# Patient Record
Sex: FEMALE | Race: WHITE | NOT HISPANIC OR LATINO | Employment: FULL TIME | URBAN - METROPOLITAN AREA
[De-identification: names, ages, dates, MRNs, and addresses within clinical notes are randomized per-mention and may not be internally consistent; named-entity substitution may affect disease eponyms.]

---

## 2017-01-05 ENCOUNTER — OFFICE VISIT (OUTPATIENT)
Dept: SURGERY | Facility: CLINIC | Age: 67
End: 2017-01-05
Payer: MEDICARE

## 2017-01-05 VITALS
BODY MASS INDEX: 24.42 KG/M2 | HEART RATE: 60 BPM | DIASTOLIC BLOOD PRESSURE: 75 MMHG | HEIGHT: 67 IN | WEIGHT: 155.56 LBS | TEMPERATURE: 98 F | SYSTOLIC BLOOD PRESSURE: 130 MMHG

## 2017-01-05 DIAGNOSIS — Z85.3 PERSONAL HISTORY OF BREAST CANCER: Primary | ICD-10-CM

## 2017-01-05 DIAGNOSIS — Z80.3 FAMILY HISTORY OF BREAST CANCER: ICD-10-CM

## 2017-01-05 PROCEDURE — 1157F ADVNC CARE PLAN IN RCRD: CPT | Mod: S$GLB,,, | Performed by: NURSE PRACTITIONER

## 2017-01-05 PROCEDURE — 99214 OFFICE O/P EST MOD 30 MIN: CPT | Mod: S$GLB,,, | Performed by: NURSE PRACTITIONER

## 2017-01-05 PROCEDURE — 1160F RVW MEDS BY RX/DR IN RCRD: CPT | Mod: S$GLB,,, | Performed by: NURSE PRACTITIONER

## 2017-01-05 PROCEDURE — 1126F AMNT PAIN NOTED NONE PRSNT: CPT | Mod: S$GLB,,, | Performed by: NURSE PRACTITIONER

## 2017-01-05 PROCEDURE — 99999 PR PBB SHADOW E&M-EST. PATIENT-LVL III: CPT | Mod: PBBFAC,,, | Performed by: NURSE PRACTITIONER

## 2017-01-05 PROCEDURE — 1159F MED LIST DOCD IN RCRD: CPT | Mod: S$GLB,,, | Performed by: NURSE PRACTITIONER

## 2017-01-05 RX ORDER — TRAMADOL HYDROCHLORIDE 50 MG/1
TABLET ORAL
Refills: 0 | COMMUNITY
Start: 2016-12-29 | End: 2017-03-23

## 2017-01-05 NOTE — PROGRESS NOTES
Subjective:      Patient ID: Tamra Benjamin is a 66 y.o. female.    Chief Complaint: Breast Cancer Screening (CBE/Hx of Left Breast Cancer (Survivorship))      HPI: (PF, EPF - 1-3) (Detailed, Comp, - 4) patient presents today for survivorship visit, previous surgeon Dr Robins, Rad Onc Dr Foster. Currently denies breast mass, pain, nipple discharge, skin changes, unexplained weight loss, new onset bone pain. She reports sensation of heaviness left breast, previous pain left medial chest has resolved. She request all breast imaging to be done on the [facility] with a particular mammo tech    Diagnostic mammogram and left US 7-7-2016 with focal density left breast and change in echogenicity  Core biopsy left breast 7- with DCIS, ER/[place] negative  Left lumpectomy 7- with residual 1.2cm area of DCIS, 1mm posterior margin, adjuvant XRT    Review of Systems   Constitutional: Negative for appetite change and fatigue.   Respiratory: Negative for cough and shortness of breath.    Cardiovascular: Negative for chest pain.   Musculoskeletal: Negative for back pain.     Objective:   Physical Exam   Pulmonary/Chest: She exhibits no mass, no tenderness, no edema, no deformity, no swelling and no retraction. Right breast exhibits no inverted nipple, no mass, no nipple discharge, no skin change and no tenderness. Left breast exhibits no inverted nipple, no mass, no nipple discharge, no skin change and no tenderness. Breasts are symmetrical. There is no breast swelling.   Lumpectomy scar left medial breast at 6-7 o'clock with no mass, mild skin thickening from recent XRT. No upper extremity swelling, no previous axillary lymph node biopsy. Breathing non-labored    Lymphadenopathy:     She has no cervical adenopathy.     She has no axillary adenopathy.        Right: No supraclavicular adenopathy present.        Left: No supraclavicular adenopathy present.     Assessment:       1. Personal history of breast cancer     2. 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   Time in counseling discussing previous pathology, DCIS, treatment side effects, family history of breast cancer, sporadic verses family clustering verses hereditary breast cancer, and surveillance plan of care 30 min, total time 40 min

## 2017-01-05 NOTE — LETTER
January 5, 2017      iVet Robins MD  [address]  [facility] [address]           [facility]  [address]  [facility] [address]  Phone: [phone]          Patient: Tamra Benjamin   MR Number: [id number]   YOB: 1950   Date of Visit: 1/5/2017       Dear Dr. Viet Robins:    Thank you for referring Tamra Benjamin to me for evaluation. Attached you will find relevant portions of my assessment and plan of care.    If you have questions, please do not hesitate to call me. I look forward to following Tamra Benjamin along with you.    Sincerely,    TAMMY Braga-ANASTASIAP    Enclosure  CC:  No Recipients    If you would like to receive this communication electronically, please contact [email or web address] or [phone] to request more information on [employer] Link access.    For providers and/or their staff who would like to refer a patient to [facility], please contact us through our one-stop-shop provider referral line, [facility] , at [phone].    If you feel you have received this communication in error or would no longer like to receive these types of communications, please e-mail [email or web address]

## 2017-03-23 ENCOUNTER — OFFICE VISIT (OUTPATIENT)
Dept: ENDOCRINOLOGY | Facility: CLINIC | Age: 67
End: 2017-03-23
Payer: MEDICARE

## 2017-03-23 VITALS
BODY MASS INDEX: 23.67 KG/M2 | HEART RATE: 64 BPM | HEIGHT: 67 IN | SYSTOLIC BLOOD PRESSURE: 118 MMHG | WEIGHT: 150.81 LBS | DIASTOLIC BLOOD PRESSURE: 80 MMHG

## 2017-03-23 DIAGNOSIS — E78.5 HYPERLIPIDEMIA, UNSPECIFIED HYPERLIPIDEMIA TYPE: ICD-10-CM

## 2017-03-23 DIAGNOSIS — E03.9 HYPOTHYROIDISM, UNSPECIFIED TYPE: Primary | ICD-10-CM

## 2017-03-23 DIAGNOSIS — D05.12 DCIS (DUCTAL CARCINOMA IN SITU) OF BREAST, LEFT: ICD-10-CM

## 2017-03-23 DIAGNOSIS — E04.1 THYROID NODULE: ICD-10-CM

## 2017-03-23 PROCEDURE — 99999 PR PBB SHADOW E&M-EST. PATIENT-LVL III: CPT | Mod: PBBFAC,,, | Performed by: INTERNAL MEDICINE

## 2017-03-23 PROCEDURE — 1160F RVW MEDS BY RX/DR IN RCRD: CPT | Mod: S$GLB,,, | Performed by: INTERNAL MEDICINE

## 2017-03-23 PROCEDURE — 99499 UNLISTED E&M SERVICE: CPT | Mod: S$GLB,,, | Performed by: INTERNAL MEDICINE

## 2017-03-23 PROCEDURE — 1126F AMNT PAIN NOTED NONE PRSNT: CPT | Mod: S$GLB,,, | Performed by: INTERNAL MEDICINE

## 2017-03-23 PROCEDURE — 1157F ADVNC CARE PLAN IN RCRD: CPT | Mod: S$GLB,,, | Performed by: INTERNAL MEDICINE

## 2017-03-23 PROCEDURE — 1159F MED LIST DOCD IN RCRD: CPT | Mod: S$GLB,,, | Performed by: INTERNAL MEDICINE

## 2017-03-23 PROCEDURE — 99214 OFFICE O/P EST MOD 30 MIN: CPT | Mod: S$GLB,,, | Performed by: INTERNAL MEDICINE

## 2017-03-23 NOTE — MR AVS SNAPSHOT
[facility]  [address]  [facility] [address]  Phone: [phone]  Fax: [phone]                  Tamra Benjamni   3/23/2017 7:00 AM   Office Visit    Description:  Female : 1950   Provider:  To Wetzel MD   Department:  [facility]Metab           Reason for Visit     Hyperthyroidism           Diagnoses this Visit        Comments    Hypothyroidism, unspecified type    -  Primary     Thyroid nodule         DCIS (ductal carcinoma in situ) of breast, left         Hyperlipidemia, unspecified hyperlipidemia type                To Do List           Future Appointments        Provider Department Dept Phone    3/27/2017 7:15 AM LAB, APPOINTMENT [place] [facility] [phone]    2017 8:15 AM [facility]INOLOG[facility]  [phone]      Goals (5 Years of Data)     None      Follow-Up and Disposition     Follow-up and Disposition History      [facility]     [facility] Nurse Care Line -  Assistance  Registered nurses in the [facility] Center provide clinical advisement, health education, appointment booking, and other advisory services.  Call for this free service at [phone].             Medications           Message regarding Medications     Verify the changes and/or additions to your medication regime listed below are the same as discussed with your clinician today.  If any of these changes or additions are incorrect, please notify your healthcare provider.        STOP taking these medications     tramadol (ULTRAM) 50 mg tablet TK 1 TO 2 TS PO Q 6 TO 8 H PRN P           Verify that the below list of medications is an accurate representation of the medications you are currently taking.  If none reported, the list may be blank. If incorrect, please contact your healthcare provider. Carry this list with you in case of emergency.           Current Medications     docusate sodium  "(COLACE) 50 MG capsule Take 2 capsules (100 mg total) by mouth 2 (two) times daily.    levothyroxine (SYNTHROID) 100 MCG tablet take 1 tablet by mouth once daily    pravastatin (PRAVACHOL) 20 MG tablet TK 1 T PO  HS    temazepam (RESTORIL) 30 mg capsule 30 mg nightly as needed.            Clinical Reference Information           Your Vitals Were     BP Pulse Height Weight BMI    118/80 64 5' 7" (1.702 m) 68.4 kg (150 lb 12.7 oz) 23.62 kg/m2      Blood Pressure          Most Recent Value    BP  118/80      Allergies as of 3/23/2017     Sulfa (Sulfonamide Antibiotics)      Immunizations Administered on Date of Encounter - 3/23/2017     None      Orders Placed During Today's Visit     Future Labs/Procedures Expected by Expires    Comprehensive metabolic panel  3/23/2017 3/23/2018    Lipid panel  3/23/2017 3/23/2018    TSH  3/23/2017 3/23/2018    US Soft Tissue Head Neck Thyroid  3/23/2017 3/23/2018      Instructions    Thyroid nodule and hypothyroidism  Check TSH and thyroid ultrasound  F/U Roxanne    Breast cancer left    Mild anxiety  See Dr Mulligan    Chol on statin and get chol    No recent BMD       Language Assistance Services     ATTENTION: Language assistance services are available, free of charge. Please call [phone].      ATENCIÓN: Si habla rigo, tiene a pierre disposición servicios gratuitos de asistencia lingüística. Llame al [phone].     CHÚ Ý: N?u b?n nói Ti?ng Vi?t, có các d?ch v? h? tr? ngôn ng? mi?n phí dành cho b?n. G?i s? [phone].         [facility] complies with applicable Federal civil rights laws and does not discriminate on the basis of race, color, national origin, age, disability, or sex.        "

## 2017-03-23 NOTE — PROGRESS NOTES
Subjective:      Patient ID: Tamra Benjamin is a 66 y.o. female.    Chief Complaint:  Hyperthyroidism      History of Present Illness  Last seen 6/23/2014  FNA thyroid benign 3/11/2013  Last US of the thyroid 6/10/2014 Isoechoic and 2x.9x.7cm Right  Thyroid nodule unchanged  Levothyroxine 100mcg/d    Since then DCIS breast cancer left and radiation treatment. 2017    Colon polyps      Left torn meniscus    Chol statin    Mild hoarseness with virus    Review of Systems   Constitutional: Positive for fatigue. Negative for unexpected weight change.   Cardiovascular: Positive for chest pain.        Angiogram neg   Gastrointestinal: Positive for constipation. Negative for diarrhea.   Musculoskeletal: Positive for arthralgias. Negative for back pain.        Hands   Neurological: Negative for headaches.   Psychiatric/Behavioral: The patient is nervous/anxious.        Objective:   Physical Exam   Constitutional: She is oriented to person, place, and time. She appears well-developed and well-nourished.   HENT:   Head: Normocephalic and atraumatic.   Neck: No thyromegaly present.   Cardiovascular: Normal rate, regular rhythm and normal heart sounds.    No murmur heard.  Pulmonary/Chest: Effort normal and breath sounds normal.   Lymphadenopathy:     She has no cervical adenopathy.   Neurological: She is alert and oriented to person, place, and time. She has normal reflexes.   Vibration minimal deficit   Vitals reviewed.      Lab Review:   Results for orders placed or performed in visit on 07/13/16   Basic metabolic panel   Result Value Ref Range    Sodium 143 136 - 145 mmol/L    Potassium 4.0 3.5 - 5.1 mmol/L    Chloride 107 95 - 110 mmol/L    CO2 25 23 - 29 mmol/L    Glucose 90 70 - 110 mg/dL    BUN, Bld 18 8 - 23 mg/dL    Creatinine 0.8 0.5 - 1.4 mg/dL    Calcium 9.6 8.7 - 10.5 mg/dL    Anion Gap 11 8 - 16 mmol/L    eGFR if African American >60.0 >60 mL/min/1.73 m^2    eGFR if non African American >60.0 >60  mL/min/1.73 m^2   CBC auto differential   Result Value Ref Range    WBC 6.45 3.90 - 12.70 K/uL    RBC 4.64 4.00 - 5.40 M/uL    Hemoglobin 13.2 12.0 - 16.0 g/dL    Hematocrit 38.6 37.0 - 48.5 %    MCV 83 82 - 98 fL    MCH 28.4 27.0 - 31.0 pg    MCHC 34.2 32.0 - 36.0 %    RDW 13.7 11.5 - 14.5 %    Platelets 261 150 - 350 K/uL    MPV 10.6 9.2 - 12.9 fL    Gran # 3.9 1.8 - 7.7 K/uL    Lymph # 2.2 1.0 - 4.8 K/uL    Mono # 0.3 0.3 - 1.0 K/uL    Eos # 0.1 0.0 - 0.5 K/uL    Baso # 0.01 0.00 - 0.20 K/uL    Gran% 60.1 38.0 - 73.0 %    Lymph% 33.5 18.0 - 48.0 %    Mono% 4.8 4.0 - 15.0 %    Eosinophil% 1.1 0.0 - 8.0 %    Basophil% 0.2 0.0 - 1.9 %    Differential Method Automated          Assessment:     Thyroid nodule and hypothyroidism  Check TSH and thyroid ultrasound  F/U Roxanne    Breast cancer left    Mild anxiety  See Dr Mulligan    Chol on statin and get chol    No recent BMD    Plan:     Orders Placed This Encounter   Procedures    US Soft Tissue Head Neck Thyroid     Standing Status:   Future     Standing Expiration Date:   3/23/2018     Order Specific Question:   Reason for Exam:     Answer:   Thyroid nodule    TSH     Standing Status:   Future     Standing Expiration Date:   3/23/2018    Lipid panel     Standing Status:   Future     Standing Expiration Date:   3/23/2018    Comprehensive metabolic panel     Standing Status:   Future     Standing Expiration Date:   3/23/2018

## 2017-03-23 NOTE — PATIENT INSTRUCTIONS
Thyroid nodule and hypothyroidism  Check TSH and thyroid ultrasound  F/U Roxanne    Breast cancer left    Mild anxiety  See Dr Mulligan    Chol on statin and get chol    No recent BMD

## 2017-03-27 ENCOUNTER — LAB VISIT (OUTPATIENT)
Dept: LAB | Facility: HOSPITAL | Age: 67
End: 2017-03-27
Attending: INTERNAL MEDICINE
Payer: MEDICARE

## 2017-03-27 DIAGNOSIS — E78.5 HYPERLIPIDEMIA, UNSPECIFIED HYPERLIPIDEMIA TYPE: ICD-10-CM

## 2017-03-27 DIAGNOSIS — E03.9 HYPOTHYROIDISM, UNSPECIFIED TYPE: ICD-10-CM

## 2017-03-27 LAB
ALBUMIN SERPL BCP-MCNC: 3.6 G/DL
ALP SERPL-CCNC: 73 U/L
ALT SERPL W/O P-5'-P-CCNC: 18 U/L
ANION GAP SERPL CALC-SCNC: 9 MMOL/L
AST SERPL-CCNC: 21 U/L
BILIRUB SERPL-MCNC: 0.3 MG/DL
BUN SERPL-MCNC: 16 MG/DL
CALCIUM SERPL-MCNC: 9.3 MG/DL
CHLORIDE SERPL-SCNC: 108 MMOL/L
CHOLEST/HDLC SERPL: 3.8 {RATIO}
CO2 SERPL-SCNC: 25 MMOL/L
CREAT SERPL-MCNC: 0.8 MG/DL
EST. GFR  (AFRICAN AMERICAN): >60 ML/MIN/1.73 M^2
EST. GFR  (NON AFRICAN AMERICAN): >60 ML/MIN/1.73 M^2
GLUCOSE SERPL-MCNC: 98 MG/DL
HDL/CHOLESTEROL RATIO: 26.4 %
HDLC SERPL-MCNC: 178 MG/DL
HDLC SERPL-MCNC: 47 MG/DL
LDLC SERPL CALC-MCNC: 114.2 MG/DL
NONHDLC SERPL-MCNC: 131 MG/DL
POTASSIUM SERPL-SCNC: 4.5 MMOL/L
PROT SERPL-MCNC: 6.8 G/DL
SODIUM SERPL-SCNC: 142 MMOL/L
TRIGL SERPL-MCNC: 84 MG/DL
TSH SERPL DL<=0.005 MIU/L-ACNC: 1.19 UIU/ML

## 2017-03-27 PROCEDURE — 80061 LIPID PANEL: CPT

## 2017-03-27 PROCEDURE — 80053 COMPREHEN METABOLIC PANEL: CPT

## 2017-03-27 PROCEDURE — 36415 COLL VENOUS BLD VENIPUNCTURE: CPT | Mod: PO

## 2017-03-27 PROCEDURE — 84443 ASSAY THYROID STIM HORMONE: CPT

## 2017-04-26 ENCOUNTER — HOSPITAL ENCOUNTER (OUTPATIENT)
Dept: ENDOCRINOLOGY | Facility: CLINIC | Age: 67
Discharge: HOME | End: 2017-04-26
Attending: INTERNAL MEDICINE
Payer: MEDICARE

## 2017-04-26 DIAGNOSIS — E04.1 THYROID NODULE: ICD-10-CM

## 2017-04-26 PROCEDURE — 76536 US EXAM OF HEAD AND NECK: CPT | Mod: S$GLB,,, | Performed by: INTERNAL MEDICINE

## 2017-04-26 RX ORDER — LEVOTHYROXINE SODIUM 100 UG/1
100 TABLET ORAL DAILY
Qty: 90 TABLET | Refills: 3 | Status: SHIPPED | OUTPATIENT
Start: 2017-04-26 | End: 2018-07-07 | Stop reason: SDUPTHER

## 2017-06-14 ENCOUNTER — TELEPHONE (OUTPATIENT)
Dept: SURGERY | Facility: CLINIC | Age: 67
End: 2017-06-14

## 2017-06-14 NOTE — TELEPHONE ENCOUNTER
----- Message from Rosita Sanders sent at 6/14/2017  8:41 AM CDT -----  Contact: self   Tamra States that she needs a call returned by you because she needs to speak with you about her breast issues and doesn't want speak with anyone else  . Please call Tamra @ [phone] .Thanks :)

## 2017-06-14 NOTE — TELEPHONE ENCOUNTER
Pt states that she has recently joined the gym and had been doing hard work out sessions. Pt has noticed breast swelling and wanted to know if she needed to be see sooner than her July follow up. Pt is s/p lumpectomy 7/2016 w/Dr Robins and had 38 radiation treatments per pt. Informed pt that the breast swelling is likely due to the increased activity coupled with the radiation effects on the breast causing the edema which does not allow the complete drainage of lymph fluid. Pt states the swelling was better after sleeping at night. Wanted to know if she should stop going to the gym. Instructed pt that she may continue with the gym, but if any further concerns, should call for a sooner appt. Pt due to have mammogram and see Lacy in July.

## 2017-07-17 ENCOUNTER — OFFICE VISIT (OUTPATIENT)
Dept: SURGERY | Facility: CLINIC | Age: 67
End: 2017-07-17
Payer: MEDICARE

## 2017-07-17 VITALS
HEIGHT: 67 IN | HEART RATE: 70 BPM | DIASTOLIC BLOOD PRESSURE: 78 MMHG | WEIGHT: 149.56 LBS | SYSTOLIC BLOOD PRESSURE: 129 MMHG | BODY MASS INDEX: 23.47 KG/M2 | TEMPERATURE: 98 F

## 2017-07-17 DIAGNOSIS — Z85.3 PERSONAL HISTORY OF BREAST CANCER: Primary | ICD-10-CM

## 2017-07-17 PROCEDURE — 99213 OFFICE O/P EST LOW 20 MIN: CPT | Mod: S$GLB,,, | Performed by: NURSE PRACTITIONER

## 2017-07-17 PROCEDURE — 1159F MED LIST DOCD IN RCRD: CPT | Mod: S$GLB,,, | Performed by: NURSE PRACTITIONER

## 2017-07-17 PROCEDURE — 99999 PR PBB SHADOW E&M-EST. PATIENT-LVL III: CPT | Mod: PBBFAC,,, | Performed by: NURSE PRACTITIONER

## 2017-07-17 PROCEDURE — 1126F AMNT PAIN NOTED NONE PRSNT: CPT | Mod: S$GLB,,, | Performed by: NURSE PRACTITIONER

## 2017-07-17 NOTE — PROGRESS NOTES
Subjective:      Patient ID: Tamra Benjamin is a 66 y.o. female.    Chief Complaint: Breast Cancer Screening (CBE/Hx of Breast Cancer)      HPI: (PF, EPF - 1-3) (Detailed, Comp, - 4)  patient presents today for breast cancer surveillance. Currently denies breast mass, pain, nipple discharge, skin changes, unexplained weight loss, new onset bone pain.   She request all breast imaging to be done on the [facility] with a particular mammo tech, scheduled 7-     Diagnostic mammogram and left US 7-7-2016 with focal density left breast and change in echogenicity  Core biopsy left breast 7- with DCIS, ER/[place] negative  Left lumpectomy 7- with residual 1.2cm area of DCIS, 1mm posterior margin, adjuvant XRT      Review of Systems   Constitutional: Negative for appetite change and fatigue.   Respiratory: Negative for cough and shortness of breath.    Cardiovascular: Negative for chest pain.   Musculoskeletal: Negative for back pain.     Objective:   Physical Exam   Pulmonary/Chest: She exhibits no mass, no tenderness, no laceration, no edema, no deformity, no swelling and no retraction. Right breast exhibits no inverted nipple, no mass, no nipple discharge, no skin change and no tenderness. Left breast exhibits no inverted nipple, no mass, no nipple discharge, no skin change and no tenderness. Breasts are symmetrical. There is no breast swelling.   Lymphadenopathy:     She has no cervical adenopathy.     She has no axillary adenopathy.        Right: No supraclavicular adenopathy present.        Left: No supraclavicular adenopathy present.     Assessment:       1. Personal history of breast cancer        Plan:       mmg is scheduled for next week, if no changes or abnormality she can return in 6 months CBE  Call for any interval palpable breast mass, pain, nipple discharge, skin changes or other breast related concerns

## 2017-07-24 ENCOUNTER — HOSPITAL ENCOUNTER (OUTPATIENT)
Dept: RADIOLOGY | Facility: HOSPITAL | Age: 67
Discharge: HOME | End: 2017-07-24
Attending: RADIOLOGY
Payer: MEDICARE

## 2017-07-24 DIAGNOSIS — D05.12 DUCTAL CARCINOMA IN SITU (DCIS) OF LEFT BREAST: ICD-10-CM

## 2017-07-24 PROCEDURE — 77066 DX MAMMO INCL CAD BI: CPT | Mod: TC

## 2017-07-24 PROCEDURE — 77062 BREAST TOMOSYNTHESIS BI: CPT | Mod: 26,,, | Performed by: RADIOLOGY

## 2017-07-24 PROCEDURE — 77066 DX MAMMO INCL CAD BI: CPT | Mod: 26,,, | Performed by: RADIOLOGY

## 2017-08-07 ENCOUNTER — OFFICE VISIT (OUTPATIENT)
Dept: INTERNAL MEDICINE | Facility: CLINIC | Age: 67
End: 2017-08-07
Payer: MEDICARE

## 2017-08-07 VITALS
OXYGEN SATURATION: 97 % | WEIGHT: 150.38 LBS | TEMPERATURE: 98 F | HEART RATE: 70 BPM | BODY MASS INDEX: 23.6 KG/M2 | SYSTOLIC BLOOD PRESSURE: 124 MMHG | HEIGHT: 67 IN | DIASTOLIC BLOOD PRESSURE: 78 MMHG

## 2017-08-07 DIAGNOSIS — Z85.3 HISTORY OF BREAST CANCER: ICD-10-CM

## 2017-08-07 DIAGNOSIS — Z00.00 ENCOUNTER FOR PREVENTIVE HEALTH EXAMINATION: Primary | ICD-10-CM

## 2017-08-07 DIAGNOSIS — E03.9 HYPOTHYROIDISM, UNSPECIFIED TYPE: ICD-10-CM

## 2017-08-07 DIAGNOSIS — M79.2 NEURALGIA AND NEURITIS: ICD-10-CM

## 2017-08-07 DIAGNOSIS — E78.5 HYPERLIPIDEMIA, UNSPECIFIED HYPERLIPIDEMIA TYPE: ICD-10-CM

## 2017-08-07 PROCEDURE — G0439 PPPS, SUBSEQ VISIT: HCPCS | Mod: S$GLB,,, | Performed by: NURSE PRACTITIONER

## 2017-08-07 PROCEDURE — 99499 UNLISTED E&M SERVICE: CPT | Mod: S$GLB,,, | Performed by: NURSE PRACTITIONER

## 2017-08-07 PROCEDURE — 99999 PR PBB SHADOW E&M-EST. PATIENT-LVL IV: CPT | Mod: PBBFAC,,, | Performed by: NURSE PRACTITIONER

## 2017-08-07 NOTE — PATIENT INSTRUCTIONS
Counseling and Referral of Other Preventative  (Italic type indicates deductible and co-insurance are waived)    Patient Name: Tamra Benjamin  Today's Date: 8/7/2017      SERVICE LIMITATIONS RECOMMENDATION    Vaccines    · Pneumococcal (once after 65)    · Influenza (annually)    · Hepatitis B (if medium/high risk)    · Prevnar 13      Hepatitis B medium/high risk factors:       - End-stage renal disease       - Hemophiliacs who received Factor VII or         IX concentrates       - Clients of institutions for the mentally             retarded       - Persons who live in the same house as          a HepB carrier       - Homosexual men       - Illicit injectable drug abusers     Pneumococcal: Recommended to patient, declined     Influenza: Recommended to patient, declined     Hepatitis B: N/A     Prevnar 13: Recommended to patient, declined    Mammogram (biennial age 50-74)  Annually (age 40 or over)  Done this year, repeat every year    Pap (up to age 70 and after 70 if unknown history or abnormal study last 10 years)    Scheduled, see appointments     The USPSTF recommends against screening for cervical cancer in women older than age 65 years who have had adequate prior screening and are not otherwise at high risk for cervical cancer.      Colorectal cancer screening (to age 75)    · Fecal occult blood test (annual)  · Flexible sigmoidoscopy (5y)  · Screening colonoscopy (10y)  · Barium enema   Last done 2017, recommend to repeat every 1  years    Diabetes self-management training (no USPSTF recommendations)  Requires referral by treating physician for patient with diabetes or renal disease. 10 hours of initial DSMT sessions of no less than 30 minutes each in a continuous 12-month period. 2 hours of follow-up DSMT in subsequent years.  N/A    Bone mass measurements (age 65 & older, biennial)  Requires diagnosis related to osteoporosis or estrogen deficiency. Biennial benefit unless patient has history of  long-term glucocorticoid  Scheduled, see appointments    Glaucoma screening (no USPSTF recommendation)  Diabetes mellitus, family history   , age 50 or over    American, age 65 or over  Done this year, repeat every year    Medical nutrition therapy for diabetes or renal disease (no recommended schedule)  Requires referral by treating physician for patient with diabetes or renal disease or kidney transplant within the past 3 years.  Can be provided in same year as diabetes self-management training (DSMT), and CMS recommends medical nutrition therapy take place after DSMT. Up to 3 hours for initial year and 2 hours in subsequent years.  N/A    Cardiovascular screening blood tests (every 5 years)  · Fasting lipid panel  Order as a panel if possible  Done this year, repeat every year    Diabetes screening tests (at least every 3 years, Medicare covers annually or at 6-month intervals for prediabetic patients)  · Fasting blood sugar (FBS) or glucose tolerance test (GTT)  Patient must be diagnosed with one of the following:       - Hypertension       - Dyslipidemia       - Obesity (BMI 30kg/m2)       - Previous elevated impaired FBS or GTT       ... or any two of the following:       - Overweight (BMI 25 but <30)       - Family history of diabetes       - Age 65 or older       - History of gestational diabetes or birth of baby weighing more than 9 pounds  Done this year, repeat every year    HIV screening (annually for increased risk patients)  · HIV-1 and HIV-2 by EIA, or FAB, rapid antibody test or oral mucosa transudate  Patients must be at increased risk for HIV infection per USPSTF guidelines or pregnant. Tests covered annually for patient at increased risk or as requested by the patient. Pregnant patients may receive up to 3 tests during pregnancy.  Risks discussed, screening is not recommended    Smoking cessation counseling (up to 8 sessions per year)  Patients must be asymptomatic of  tobacco-related conditions to receive as a preventative service.  Non-smoker    Subsequent annual wellness visit  At least 12 months since last AWV  Return in one year     The following information is provided to all patients.  This information is to help you find resources for any of the problems found today that may be affecting your health:                Living healthy guide: www.[facility]      Understanding Diabetes: www.diabetes.org      Eating healthy: www.cdc.gov/healthyweight      CDC home safety checklist: www.cdc.gov/steadi/patient.html      Agency on Aging: www.goea.[place].[facility]      Alcoholics anonymous (AA): www.aa.org      Physical Activity: www.bob.nih.gov/nw3fino      Tobacco use: www.quitwithusla.org

## 2017-08-16 RX ORDER — AA/PROT/LYSINE/METHIO/VIT C/B6 50-12.5 MG
10 TABLET ORAL DAILY
COMMUNITY

## 2017-08-16 RX ORDER — AMOXICILLIN 500 MG
2 CAPSULE ORAL DAILY
COMMUNITY
End: 2018-12-11

## 2017-08-16 RX ORDER — CHOLECALCIFEROL (VITAMIN D3) 25 MCG
1000 TABLET ORAL DAILY
COMMUNITY

## 2017-08-16 NOTE — PROGRESS NOTES
"Tamra Benjamin presented for a  Medicare AWV and comprehensive Health Risk Assessment today. The following components were reviewed and updated:    · Medical history  · Family History  · Social history  · Allergies and Current Medications  · Health Risk Assessment  · Health Maintenance  · Care Team     ** See Completed Assessments for Annual Wellness Visit within the encounter summary.**       The following assessments were completed:  · Living Situation  · CAGE  · Depression Screening  · Timed Get Up and Go  · Whisper Test  · Cognitive Function Screening  · Nutrition Screening  · ADL Screening  · PAQ Screening    Vitals:    08/07/17 1101   BP: 124/78   Pulse: 70   Temp: 98.2 °F (36.8 °C)   TempSrc: Oral   SpO2: 97%   Weight: 68.2 kg (150 lb 5.7 oz)   Height: 5' 7" (1.702 m)     Body mass index is 23.55 kg/m².  Physical Exam   Constitutional: She is oriented to person, place, and time. She appears well-developed and well-nourished.   HENT:   Head: Normocephalic and atraumatic.   Nose: Nose normal.   Eyes: Conjunctivae and EOM are normal.   Neck: Normal range of motion. Neck supple.   Cardiovascular: Normal rate, regular rhythm, normal heart sounds and intact distal pulses.    No murmur heard.  Pulmonary/Chest: Effort normal and breath sounds normal.   Musculoskeletal: Normal range of motion.   Neurological: She is alert and oriented to person, place, and time.   Skin: Skin is warm and dry.   Psychiatric: She has a normal mood and affect. Her behavior is normal. Judgment and thought content normal.   Nursing note and vitals reviewed.        Diagnoses and health risks identified today and associated recommendations/orders:    1. Encounter for preventive health examination  Assessment performed. Health maintenance updated. Chart review completed.    2. Neuralgia and neuritis  Stable. Chronic. Followed by PCP.    3. Hyperlipidemia, unspecified hyperlipidemia type  Chronic. Stable. Followed by PCP.    4. History of " breast cancer  Stable. Followed by PCP.    5. Hypothyroidism, unspecified type  Stable on current regimen. Followed by PCP.      Provided Tamra with a 5-10 year written screening schedule and personal prevention plan. Recommendations were developed using the USPSTF age appropriate recommendations. Education, counseling, and referrals were provided as needed. After Visit Summary printed and given to patient which includes a list of additional screenings\tests needed.    Return for follow up with Primary Care Provider as instructed, ;sooner if problems, HRA in 1 year.    RAJNI Cook

## 2017-09-25 ENCOUNTER — PATIENT MESSAGE (OUTPATIENT)
Dept: INTERNAL MEDICINE | Facility: CLINIC | Age: 67
End: 2017-09-25

## 2017-09-25 ENCOUNTER — OFFICE VISIT (OUTPATIENT)
Dept: INTERNAL MEDICINE | Facility: CLINIC | Age: 67
End: 2017-09-25
Payer: MEDICARE

## 2017-09-25 VITALS
SYSTOLIC BLOOD PRESSURE: 132 MMHG | DIASTOLIC BLOOD PRESSURE: 80 MMHG | HEIGHT: 67 IN | OXYGEN SATURATION: 98 % | WEIGHT: 153.44 LBS | BODY MASS INDEX: 24.08 KG/M2 | HEART RATE: 72 BPM

## 2017-09-25 DIAGNOSIS — E04.1 THYROID NODULE: ICD-10-CM

## 2017-09-25 DIAGNOSIS — R82.90 ABNORMAL URINE: Primary | ICD-10-CM

## 2017-09-25 DIAGNOSIS — Z00.00 ANNUAL PHYSICAL EXAM: Primary | ICD-10-CM

## 2017-09-25 DIAGNOSIS — E78.5 HYPERLIPIDEMIA, UNSPECIFIED HYPERLIPIDEMIA TYPE: ICD-10-CM

## 2017-09-25 DIAGNOSIS — K63.5 POLYP OF COLON, UNSPECIFIED PART OF COLON, UNSPECIFIED TYPE: ICD-10-CM

## 2017-09-25 DIAGNOSIS — E03.9 HYPOTHYROIDISM, UNSPECIFIED TYPE: ICD-10-CM

## 2017-09-25 DIAGNOSIS — E04.2 MULTIPLE THYROID NODULES: ICD-10-CM

## 2017-09-25 LAB
BACTERIA #/AREA URNS AUTO: ABNORMAL /HPF
BILIRUB UR QL STRIP: NEGATIVE
CLARITY UR REFRACT.AUTO: CLEAR
COLOR UR AUTO: YELLOW
GLUCOSE UR QL STRIP: NEGATIVE
HGB UR QL STRIP: NEGATIVE
KETONES UR QL STRIP: NEGATIVE
LEUKOCYTE ESTERASE UR QL STRIP: ABNORMAL
MICROSCOPIC COMMENT: ABNORMAL
NITRITE UR QL STRIP: NEGATIVE
PH UR STRIP: 5 [PH] (ref 5–8)
PROT UR QL STRIP: NEGATIVE
RBC #/AREA URNS AUTO: 0 /HPF (ref 0–4)
SP GR UR STRIP: 1.02 (ref 1–1.03)
SQUAMOUS #/AREA URNS AUTO: 2 /HPF
URN SPEC COLLECT METH UR: ABNORMAL
UROBILINOGEN UR STRIP-ACNC: NEGATIVE EU/DL
WBC #/AREA URNS AUTO: 9 /HPF (ref 0–5)

## 2017-09-25 PROCEDURE — 99999 PR PBB SHADOW E&M-EST. PATIENT-LVL III: CPT | Mod: PBBFAC,,, | Performed by: INTERNAL MEDICINE

## 2017-09-25 PROCEDURE — 81001 URINALYSIS AUTO W/SCOPE: CPT

## 2017-09-25 PROCEDURE — 99499 UNLISTED E&M SERVICE: CPT | Mod: S$GLB,,, | Performed by: INTERNAL MEDICINE

## 2017-09-25 PROCEDURE — 99397 PER PM REEVAL EST PAT 65+ YR: CPT | Mod: S$GLB,,, | Performed by: INTERNAL MEDICINE

## 2017-09-27 ENCOUNTER — TELEPHONE (OUTPATIENT)
Dept: INTERNAL MEDICINE | Facility: CLINIC | Age: 67
End: 2017-09-27

## 2017-09-27 NOTE — TELEPHONE ENCOUNTER
----- Message from Heraclio Mulligan MD sent at 9/25/2017  8:40 PM CDT -----  Please contact and notify that her urine suggested that she may have a urinary tract infection. I would like to get a UA and culture. Orders are in.

## 2017-09-28 NOTE — PROGRESS NOTES
CHIEF COMPLAINT:  Physical exam.    HISTORY OF PRESENT ILLNESS:  The patient is a 67-year-old female who is coming   in for annual physical exam.  The patient is new to me.  She does have a history   of hypothyroidism as well as hyperlipidemia.  The patient is currently on   Synthroid 100 mcg once a day, pravastatin 20 mg once a day.  The patient does   have a history of left breast cancer status post lumpectomy.    REVIEW OF SYSTEMS:  The patient reports her weight has been staying about the   same.  No visual change, no auditory change, no dysphagia, no chest pain, no   shortness of breath.  The patient was seeing a Dr. Hennessy at [facility] who performed an angiogram, which she reports was normal   because she was having some chest discomfort and shortness of breath, but not   now.  No nausea, vomiting, no abdominal pain, no bowel changes, no bladder   changes.  She does have a history of a stomach ulcer in the past secondary to   Fosamax.  The patient does use a stool softener on occasion.  No weakness in the   arms or legs.  She does see Lacy Hanna at the [facility] [place] for   breast exams.  No numbness or tingling in the arms or legs.  She does take   temazepam about three times a week.  She has been doing this for years to help   her sleep.  She has trouble getting her mind to shut down.  The patient did see   Dr. Spenec about a year ago and has a followup in January 2018.  She did have a   polyp.  Her last mammogram was in July 2017.  She sees Dr. Wise for her   OB/GYN needs.  This was last checked about one year ago or over a year ago.  She   does see Dr. Torres or Desiree for her eye exams.    PHYSICAL EXAMINATION:  GENERAL APPEARANCE:  No acute distress.  HEENT:  Conjunctivae clear.  Pupils are equal.  The patient does have green   contact lenses.  Her eyes appear to be brown.  TMs were clear.  Nasal septum is   midline without discharge.  Oropharynx is without  erythema.  She does have   multiple capped teeth.  NECK:  Trachea is midline without JVD.  PULMONARY:  Good inspiratory, expiratory breath sounds are heard.  Lungs are   clear to auscultation.  CARDIOVASCULAR:  S1, S2.  Rhythm appeared to be normal.  2+ carotid pulses   without bruits.  EXTREMITIES:  Without edema.  ABDOMEN:  Nontender, nondistended, without hepatosplenomegaly.    ASSESSMENT:  1.  Physical exam.  2.  Colon polyps.  3.  Thyroid nodule.  4.  Hypothyroidism.  5.  Hyperlipidemia.    PLAN:  We will put the patient in for a UA, TSH, T4, lipid panel, CMP, CBC.  The   patient is to follow up pending results.      JDS/HN  dd: 09/28/2017 08:53:25 (CDT)  td: 09/29/2017 03:27:37 (CDT)  Doc ID   #[id number]  Job ID #[id number]    CC:

## 2017-10-02 ENCOUNTER — LAB VISIT (OUTPATIENT)
Dept: LAB | Facility: HOSPITAL | Age: 67
End: 2017-10-02
Attending: INTERNAL MEDICINE
Payer: MEDICARE

## 2017-10-02 DIAGNOSIS — E78.5 HYPERLIPIDEMIA, UNSPECIFIED HYPERLIPIDEMIA TYPE: ICD-10-CM

## 2017-10-02 DIAGNOSIS — Z00.00 ANNUAL PHYSICAL EXAM: ICD-10-CM

## 2017-10-02 DIAGNOSIS — K63.5 POLYP OF COLON, UNSPECIFIED PART OF COLON, UNSPECIFIED TYPE: ICD-10-CM

## 2017-10-02 DIAGNOSIS — E03.9 HYPOTHYROIDISM, UNSPECIFIED TYPE: ICD-10-CM

## 2017-10-02 DIAGNOSIS — E04.1 THYROID NODULE: ICD-10-CM

## 2017-10-02 LAB
ALBUMIN SERPL BCP-MCNC: 3.7 G/DL
ALP SERPL-CCNC: 67 U/L
ALT SERPL W/O P-5'-P-CCNC: 14 U/L
ANION GAP SERPL CALC-SCNC: 11 MMOL/L
AST SERPL-CCNC: 16 U/L
BASOPHILS # BLD AUTO: 0.01 K/UL
BASOPHILS NFR BLD: 0.2 %
BILIRUB SERPL-MCNC: 0.4 MG/DL
BUN SERPL-MCNC: 15 MG/DL
CALCIUM SERPL-MCNC: 9 MG/DL
CHLORIDE SERPL-SCNC: 110 MMOL/L
CHOLEST SERPL-MCNC: 186 MG/DL
CHOLEST/HDLC SERPL: 3.9 {RATIO}
CO2 SERPL-SCNC: 21 MMOL/L
CREAT SERPL-MCNC: 0.8 MG/DL
DIFFERENTIAL METHOD: NORMAL
EOSINOPHIL # BLD AUTO: 0.1 K/UL
EOSINOPHIL NFR BLD: 1.8 %
ERYTHROCYTE [DISTWIDTH] IN BLOOD BY AUTOMATED COUNT: 14 %
EST. GFR  (AFRICAN AMERICAN): >60 ML/MIN/1.73 M^2
EST. GFR  (NON AFRICAN AMERICAN): >60 ML/MIN/1.73 M^2
GLUCOSE SERPL-MCNC: 98 MG/DL
HCT VFR BLD AUTO: 39 %
HDLC SERPL-MCNC: 48 MG/DL
HDLC SERPL: 25.8 %
HGB BLD-MCNC: 13.1 G/DL
LDLC SERPL CALC-MCNC: 108.4 MG/DL
LYMPHOCYTES # BLD AUTO: 1.6 K/UL
LYMPHOCYTES NFR BLD: 33.1 %
MCH RBC QN AUTO: 28 PG
MCHC RBC AUTO-ENTMCNC: 33.6 G/DL
MCV RBC AUTO: 83 FL
MONOCYTES # BLD AUTO: 0.3 K/UL
MONOCYTES NFR BLD: 6.7 %
NEUTROPHILS # BLD AUTO: 2.8 K/UL
NEUTROPHILS NFR BLD: 58 %
NONHDLC SERPL-MCNC: 138 MG/DL
PLATELET # BLD AUTO: 256 K/UL
PMV BLD AUTO: 10.5 FL
POTASSIUM SERPL-SCNC: 4.4 MMOL/L
PROT SERPL-MCNC: 6.9 G/DL
RBC # BLD AUTO: 4.68 M/UL
SODIUM SERPL-SCNC: 142 MMOL/L
T4 FREE SERPL-MCNC: 1.05 NG/DL
TRIGL SERPL-MCNC: 148 MG/DL
TSH SERPL DL<=0.005 MIU/L-ACNC: 0.71 UIU/ML
WBC # BLD AUTO: 4.89 K/UL

## 2017-10-02 PROCEDURE — 84439 ASSAY OF FREE THYROXINE: CPT

## 2017-10-02 PROCEDURE — 85025 COMPLETE CBC W/AUTO DIFF WBC: CPT

## 2017-10-02 PROCEDURE — 36415 COLL VENOUS BLD VENIPUNCTURE: CPT

## 2017-10-02 PROCEDURE — 80061 LIPID PANEL: CPT

## 2017-10-02 PROCEDURE — 84443 ASSAY THYROID STIM HORMONE: CPT

## 2017-10-02 PROCEDURE — 80053 COMPREHEN METABOLIC PANEL: CPT

## 2017-12-07 ENCOUNTER — OFFICE VISIT (OUTPATIENT)
Dept: URGENT CARE | Facility: CLINIC | Age: 67
End: 2017-12-07
Payer: MEDICARE

## 2017-12-07 VITALS
OXYGEN SATURATION: 99 % | SYSTOLIC BLOOD PRESSURE: 170 MMHG | HEART RATE: 78 BPM | TEMPERATURE: 99 F | RESPIRATION RATE: 18 BRPM | BODY MASS INDEX: 23.07 KG/M2 | DIASTOLIC BLOOD PRESSURE: 79 MMHG | HEIGHT: 67 IN | WEIGHT: 147 LBS

## 2017-12-07 DIAGNOSIS — J06.9 UPPER RESPIRATORY TRACT INFECTION, UNSPECIFIED TYPE: Primary | ICD-10-CM

## 2017-12-07 PROCEDURE — 99214 OFFICE O/P EST MOD 30 MIN: CPT | Mod: 25,S$GLB,, | Performed by: PHYSICIAN ASSISTANT

## 2017-12-07 PROCEDURE — 96372 THER/PROPH/DIAG INJ SC/IM: CPT | Mod: S$GLB,,, | Performed by: EMERGENCY MEDICINE

## 2017-12-07 RX ORDER — AMOXICILLIN AND CLAVULANATE POTASSIUM 875; 125 MG/1; MG/1
1 TABLET, FILM COATED ORAL 2 TIMES DAILY
Qty: 14 TABLET | Refills: 0 | Status: SHIPPED | OUTPATIENT
Start: 2017-12-07 | End: 2017-12-14

## 2017-12-07 RX ORDER — BETAMETHASONE SODIUM PHOSPHATE AND BETAMETHASONE ACETATE 3; 3 MG/ML; MG/ML
6 INJECTION, SUSPENSION INTRA-ARTICULAR; INTRALESIONAL; INTRAMUSCULAR; SOFT TISSUE
Status: COMPLETED | OUTPATIENT
Start: 2017-12-07 | End: 2017-12-07

## 2017-12-07 RX ADMIN — BETAMETHASONE SODIUM PHOSPHATE AND BETAMETHASONE ACETATE 6 MG: 3; 3 INJECTION, SUSPENSION INTRA-ARTICULAR; INTRALESIONAL; INTRAMUSCULAR; SOFT TISSUE at 04:12

## 2017-12-07 NOTE — PROGRESS NOTES
"Subjective:       Patient ID: Tamra Benjamin is a 67 y.o. female.    Vitals:  height is 5' 7" (1.702 m) and weight is 66.7 kg (147 lb). Her temperature is 98.8 °F (37.1 °C). Her blood pressure is 170/79 (abnormal) and her pulse is 78. Her respiration is 18 and oxygen saturation is 99%.     Chief Complaint: Cough    Cough   This is a new problem. The current episode started today. The problem has been unchanged. The problem occurs constantly. The cough is non-productive. Associated symptoms include chills, headaches and a sore throat. Pertinent negatives include no chest pain, ear pain, eye redness, fever, myalgias, rash, shortness of breath or wheezing. Nothing aggravates the symptoms. She has tried nothing for the symptoms. The treatment provided no relief.     Review of Systems   Constitution: Positive for chills. Negative for fever and malaise/fatigue.   HENT: Positive for congestion and sore throat. Negative for ear pain and hoarse voice.    Eyes: Negative for discharge and redness.   Cardiovascular: Negative for chest pain, dyspnea on exertion and leg swelling.   Respiratory: Positive for cough (dry). Negative for shortness of breath, sputum production and wheezing.    Skin: Negative for rash.   Musculoskeletal: Negative for myalgias.   Gastrointestinal: Negative for abdominal pain, diarrhea, nausea and vomiting.   Neurological: Positive for headaches.       Objective:      Physical Exam   Constitutional: She is oriented to person, place, and time. She appears well-developed and well-nourished. No distress.   HENT:   Head: Normocephalic and atraumatic.   Right Ear: Hearing, tympanic membrane, external ear and ear canal normal.   Left Ear: Hearing, tympanic membrane, external ear and ear canal normal.   Nose: Mucosal edema present. Right sinus exhibits frontal sinus tenderness. Right sinus exhibits no maxillary sinus tenderness. Left sinus exhibits frontal sinus tenderness. Left sinus exhibits no maxillary " sinus tenderness.   Mouth/Throat: Uvula is midline and oropharynx is clear and moist. No oropharyngeal exudate, posterior oropharyngeal edema or posterior oropharyngeal erythema.   Eyes: Conjunctivae, EOM and lids are normal. Right eye exhibits no discharge. Left eye exhibits no discharge.   Neck: Normal range of motion. Neck supple.   Cardiovascular: Normal rate, regular rhythm and normal heart sounds.  Exam reveals no gallop and no friction rub.    No murmur heard.  Pulmonary/Chest: Effort normal and breath sounds normal. No respiratory distress. She has no decreased breath sounds. She has no wheezes. She has no rhonchi. She has no rales.   Musculoskeletal: Normal range of motion.   Lymphadenopathy:        Head (right side): No submandibular and no tonsillar adenopathy present.        Head (left side): No submandibular and no tonsillar adenopathy present.   Neurological: She is alert and oriented to person, place, and time.   Skin: Skin is warm and dry. No rash noted. No erythema.   Psychiatric: She has a normal mood and affect. Her behavior is normal.   Nursing note and vitals reviewed.      Assessment:       1. Upper respiratory tract infection, unspecified type        Plan:       -Advised patient not to fill antibiotic unless symptoms have not improved in 2-3 days. Discussed treatment options with patient. Patient understood and verbally agreed with treatment plan.    Upper respiratory tract infection, unspecified type  -     betamethasone acetate-betamethasone sodium phosphate injection 6 mg; Inject 1 mL (6 mg total) into the muscle one time.  -     amoxicillin-clavulanate 875-125mg (AUGMENTIN) 875-125 mg per tablet; Take 1 tablet by mouth 2 (two) times daily.  Dispense: 14 tablet; Refill: 0      Patient Instructions     -Take mucinex for congestion.  -Take tylenol/motrin as needed for pain/fever.    -Please wait 2-3 days; if symptoms are not improving, then fill the antibiotic. If you do begin taking the  antibiotic, please take it to completion.    Please follow up with your primary care provider within 2-5 days if your signs and symptoms have not resolved or worsen.     If your condition worsens or fails to improve we recommend that you receive another evaluation at the emergency room immediately or contact your primary medical clinic to discuss your concerns.   You must understand that you have received an Urgent Care treatment only and that you may be released before all of your medical problems are known or treated. You, the patient, will arrange for follow up care as instructed.         Self-Care for Sinusitis     Drinking plenty of water can help sinuses drain.   Sinusitis can often be managed with self-care. Self-care can keep sinuses moist and make you feel more comfortable. Remember to follow your doctor's instructions closely. This can make a big difference in getting your sinus problem under control.  Drink fluids  Drinking extra fluids helps thin your mucus. This lets it drain from your sinuses more easily. Have a glass of water every hour or two. A humidifier helps in much the same way. Fluids can also offset the drying effects of certain medicines. If you use a humidifier, follow the product maker's instructions on how to use it. Clean it on a regular schedule.  Use saltwater rinses  Rinses help keep your sinuses and nose moist. Mix a teaspoon of salt in 8 ounces of fresh, warm water. Use a bulb syringe to gently squirt the water into your nose a few times a day. You can also buy ready-made saline nasal sprays.  Apply hot or cold packs  Applying heat to the area surrounding your sinuses may make you feel more comfortable. Use a hot water bottle or a hand towel dipped in hot water. Some people also find ice packs effective for relieving pain.  Medicines  Your doctor may prescribe medications to help treat your sinusitis. If you have an infection, antibiotics can help clear it up. If you are prescribed  antibiotics, take all pills on schedule until they are gone, even if you feel better. Decongestants help relieve swelling. Use decongestant sprays for short periods only under the direction of your doctor. If you have allergies, your doctor may prescribe medications to help relieve them.   Date Last Reviewed: 10/1/2016  © 7529-0988 [employer]. [address] [place] [address], [place] [address]. All rights reserved. This information is not intended as a substitute for professional medical care. Always follow your healthcare professional's instructions.        Self-Care for Sore Throats    Sore throats happen for many reasons, such as colds, allergies, and infections caused by viruses or bacteria. In any case, your throat becomes red and sore. Your goal for self-care is to reduce your discomfort while giving your throat a chance to heal.  Moisten and soothe your throat  Tips include the following:  · Try a sip of water first thing after waking up.  · Keep your throat moist by drinking 6 or more glasses of clear liquids every day.  · Run a cool-air humidifier in your room overnight.  · Avoid cigarette smoke.   · Suck on throat lozenges, cough drops, hard candy, ice chips, or frozen fruit-juice bars. Use the sugar-free versions if your diet or medical condition requires them.  Gargle to ease irritation  Gargling every hour or 2 can ease irritation. Try gargling with 1 of these solutions:  · 1/4 teaspoon of salt in 1/2 cup of warm water  · An over-the-counter anesthetic gargle  Use medicine for more relief  Over-the-counter medicine can reduce sore throat symptoms. Ask your pharmacist if you have questions about which medicine to use:  · Ease pain with anesthetic sprays. Aspirin or an aspirin substitute also helps. Remember, never give aspirin to anyone 18 or younger, or if you are already taking blood thinners.   · For sore throats caused by allergies, try antihistamines to block the allergic  reaction.  · Remember: unless a sore throat is caused by a bacterial infection, antibiotics wont help you.  Prevent future sore throats  Prevention tips include the following:  · Stop smoking or reduce contact with secondhand smoke. Smoke irritates the tender throat lining.  · Limit contact with pets and with allergy-causing substances, such as pollen and mold.  · When youre around someone with a sore throat or cold, wash your hands often to keep viruses or bacteria from spreading.  · Dont strain your vocal cords.  Call your healthcare provider  Contact your healthcare provider if you have:  · A temperature over 101°F (38.3°C)  · White spots on the throat  · Great difficulty swallowing  · Trouble breathing  · A skin rash  · Recent exposure to someone else with strep bacteria  · Severe hoarseness and swollen glands in the neck or jaw   Date Last Reviewed: 8/1/2016  © 3188-8174 [employer]. [address] [place] [address], [place] [address]. All rights reserved. This information is not intended as a substitute for professional medical care. Always follow your healthcare professional's instructions.

## 2017-12-07 NOTE — PATIENT INSTRUCTIONS
-Take mucinex for congestion.  -Take tylenol/motrin as needed for pain/fever.    -Please wait 2-3 days; if symptoms are not improving, then fill the antibiotic. If you do begin taking the antibiotic, please take it to completion.    Please follow up with your primary care provider within 2-5 days if your signs and symptoms have not resolved or worsen.     If your condition worsens or fails to improve we recommend that you receive another evaluation at the emergency room immediately or contact your primary medical clinic to discuss your concerns.   You must understand that you have received an Urgent Care treatment only and that you may be released before all of your medical problems are known or treated. You, the patient, will arrange for follow up care as instructed.         Self-Care for Sinusitis     Drinking plenty of water can help sinuses drain.   Sinusitis can often be managed with self-care. Self-care can keep sinuses moist and make you feel more comfortable. Remember to follow your doctor's instructions closely. This can make a big difference in getting your sinus problem under control.  Drink fluids  Drinking extra fluids helps thin your mucus. This lets it drain from your sinuses more easily. Have a glass of water every hour or two. A humidifier helps in much the same way. Fluids can also offset the drying effects of certain medicines. If you use a humidifier, follow the product maker's instructions on how to use it. Clean it on a regular schedule.  Use saltwater rinses  Rinses help keep your sinuses and nose moist. Mix a teaspoon of salt in 8 ounces of fresh, warm water. Use a bulb syringe to gently squirt the water into your nose a few times a day. You can also buy ready-made saline nasal sprays.  Apply hot or cold packs  Applying heat to the area surrounding your sinuses may make you feel more comfortable. Use a hot water bottle or a hand towel dipped in hot water. Some people also find ice packs  effective for relieving pain.  Medicines  Your doctor may prescribe medications to help treat your sinusitis. If you have an infection, antibiotics can help clear it up. If you are prescribed antibiotics, take all pills on schedule until they are gone, even if you feel better. Decongestants help relieve swelling. Use decongestant sprays for short periods only under the direction of your doctor. If you have allergies, your doctor may prescribe medications to help relieve them.   Date Last Reviewed: 10/1/2016  © 2962-6047 [employer]. [address] [place] [address], [place] [address]. All rights reserved. This information is not intended as a substitute for professional medical care. Always follow your healthcare professional's instructions.        Self-Care for Sore Throats    Sore throats happen for many reasons, such as colds, allergies, and infections caused by viruses or bacteria. In any case, your throat becomes red and sore. Your goal for self-care is to reduce your discomfort while giving your throat a chance to heal.  Moisten and soothe your throat  Tips include the following:  · Try a sip of water first thing after waking up.  · Keep your throat moist by drinking 6 or more glasses of clear liquids every day.  · Run a cool-air humidifier in your room overnight.  · Avoid cigarette smoke.   · Suck on throat lozenges, cough drops, hard candy, ice chips, or frozen fruit-juice bars. Use the sugar-free versions if your diet or medical condition requires them.  Gargle to ease irritation  Gargling every hour or 2 can ease irritation. Try gargling with 1 of these solutions:  · 1/4 teaspoon of salt in 1/2 cup of warm water  · An over-the-counter anesthetic gargle  Use medicine for more relief  Over-the-counter medicine can reduce sore throat symptoms. Ask your pharmacist if you have questions about which medicine to use:  · Ease pain with anesthetic sprays. Aspirin or an aspirin substitute also helps. Remember,  never give aspirin to anyone 18 or younger, or if you are already taking blood thinners.   · For sore throats caused by allergies, try antihistamines to block the allergic reaction.  · Remember: unless a sore throat is caused by a bacterial infection, antibiotics wont help you.  Prevent future sore throats  Prevention tips include the following:  · Stop smoking or reduce contact with secondhand smoke. Smoke irritates the tender throat lining.  · Limit contact with pets and with allergy-causing substances, such as pollen and mold.  · When youre around someone with a sore throat or cold, wash your hands often to keep viruses or bacteria from spreading.  · Dont strain your vocal cords.  Call your healthcare provider  Contact your healthcare provider if you have:  · A temperature over 101°F (38.3°C)  · White spots on the throat  · Great difficulty swallowing  · Trouble breathing  · A skin rash  · Recent exposure to someone else with strep bacteria  · Severe hoarseness and swollen glands in the neck or jaw   Date Last Reviewed: 8/1/2016  © 7609-8290 [employer]. [address] [place] [address], [place] [address]. All rights reserved. This information is not intended as a substitute for professional medical care. Always follow your healthcare professional's instructions.

## 2017-12-22 ENCOUNTER — OFFICE VISIT (OUTPATIENT)
Dept: URGENT CARE | Facility: CLINIC | Age: 67
End: 2017-12-22
Payer: MEDICARE

## 2017-12-22 VITALS
WEIGHT: 147 LBS | OXYGEN SATURATION: 95 % | HEIGHT: 67 IN | SYSTOLIC BLOOD PRESSURE: 159 MMHG | DIASTOLIC BLOOD PRESSURE: 80 MMHG | RESPIRATION RATE: 18 BRPM | TEMPERATURE: 97 F | BODY MASS INDEX: 23.07 KG/M2 | HEART RATE: 97 BPM

## 2017-12-22 DIAGNOSIS — B35.1 ONYCHOMYCOSIS: ICD-10-CM

## 2017-12-22 DIAGNOSIS — R05.9 COUGH: ICD-10-CM

## 2017-12-22 DIAGNOSIS — J06.9 UPPER RESPIRATORY TRACT INFECTION, UNSPECIFIED TYPE: Primary | ICD-10-CM

## 2017-12-22 PROCEDURE — 99214 OFFICE O/P EST MOD 30 MIN: CPT | Mod: S$GLB,,, | Performed by: NURSE PRACTITIONER

## 2017-12-22 RX ORDER — BENZONATATE 100 MG/1
100 CAPSULE ORAL EVERY 6 HOURS PRN
Qty: 30 CAPSULE | Refills: 0 | Status: SHIPPED | OUTPATIENT
Start: 2017-12-22 | End: 2018-12-11

## 2017-12-23 NOTE — PROGRESS NOTES
"Subjective:       Patient ID: Tamra Benjamin is a 67 y.o. female.    Vitals:  height is 5' 7" (1.702 m) and weight is 66.7 kg (147 lb). Her temperature is 97.1 °F (36.2 °C). Her blood pressure is 159/80 (abnormal) and her pulse is 97. Her respiration is 18 and oxygen saturation is 95%.     Chief Complaint: Cough    Nose swollen and very sensitive    C/o nails lifting    Ms Benjamin comes to the clinic with complaint of sinus congestion and rhinorrhea, internal nares lesion, and nails coming off.    She was recently treated with both Augmentin and tamiflu.  She completed all medication but still has cough and some lingering sinus congestion.  She also took zovirax for herpes labialis and was concerned her nasal lesion was associated with it.  Nasal lesion is not vesicular and appears to be irritation secondary to frequent nose blowing.    Patient states her nails had spontaneously come off in the past 10 years ago.  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  There appears to be white fibrous material under the nail beds on each finger and all nail beds are loose.      Cough   This is a new problem. The current episode started in the past 7 days. The problem has been gradually worsening. The problem occurs every few minutes. The cough is non-productive. Associated symptoms include headaches, nasal congestion and postnasal drip. Pertinent negatives include no chest pain, chills, ear pain, eye redness, fever, myalgias, sore throat, shortness of breath or wheezing. Nothing aggravates the symptoms. Treatments tried: zovirax,  The treatment provided no relief.     Review of Systems   Constitution: Positive for malaise/fatigue. Negative for chills and fever.   HENT: Positive for congestion and postnasal drip. Negative for ear pain, hoarse voice and sore throat.    Eyes: Negative for discharge and redness.   Cardiovascular: Negative for chest pain, dyspnea on exertion and leg " swelling.   Respiratory: Positive for cough. Negative for shortness of breath, sputum production and wheezing.    Skin: Positive for suspicious lesions.   Musculoskeletal: Negative for myalgias.   Gastrointestinal: Negative for abdominal pain and nausea.   Neurological: Positive for headaches.       Objective:      Physical Exam   Constitutional: She is oriented to person, place, and time. She appears well-developed and well-nourished. She is cooperative.  Non-toxic appearance. She does not appear ill. No distress.   HENT:   Head: Normocephalic and atraumatic.   Right Ear: Hearing, tympanic membrane, external ear and ear canal normal.   Left Ear: Hearing, tympanic membrane, external ear and ear canal normal.   Nose: Rhinorrhea present. No mucosal edema or nasal deformity. No epistaxis. Right sinus exhibits no maxillary sinus tenderness and no frontal sinus tenderness. Left sinus exhibits no maxillary sinus tenderness and no frontal sinus tenderness.       Mouth/Throat: Uvula is midline and mucous membranes are normal. No trismus in the jaw. Normal dentition. No uvula swelling. Posterior oropharyngeal erythema present.   Internal left nares septal lesion   Eyes: Conjunctivae and lids are normal. No scleral icterus.   Sclera clear bilat   Neck: Trachea normal, full passive range of motion without pain and phonation normal. Neck supple.   Cardiovascular: Normal rate, regular rhythm, normal heart sounds, intact distal pulses and normal pulses.    Pulmonary/Chest: Effort normal and breath sounds normal. No respiratory distress. She has no decreased breath sounds. She has no wheezes. She has no rhonchi. She has no rales.   Abdominal: Soft. Normal appearance and bowel sounds are normal. She exhibits no distension. There is no tenderness.   Musculoskeletal: Normal range of motion. She exhibits no edema or deformity.   Neurological: She is alert and oriented to person, place, and time. She exhibits normal muscle tone.  Coordination normal.   Skin: Skin is warm, dry and intact. Lesion noted. She is not diaphoretic. No pallor.        All finger nails loose with white fibrous material to nailbeds   Psychiatric: She has a normal mood and affect. Her speech is normal and behavior is normal. Judgment and thought content normal. Cognition and memory are normal.   Nursing note and vitals reviewed.      Assessment:       1. Upper respiratory tract infection, unspecified type    2. Cough    3. Onychomycosis        Plan:         Upper respiratory tract infection, unspecified type    Cough  -     benzonatate (TESSALON PERLES) 100 MG capsule; Take 1 capsule (100 mg total) by mouth every 6 (six) hours as needed.  Dispense: 30 capsule; Refill: 0    Onychomycosis  -     Ambulatory referral to Dermatology      Continue taking Allegra and Mucinex nasal congestion and rhinorrhea.    Patient Instructions   Please drink plenty of fluids.  Please get plenty of rest.  Please return here or go to the Emergency Department for any concerns or worsening of condition.  If you do not have Hypertension or any history of palpitations, it is ok to take over the counter Sudafed or Mucinex D or Allegra-D or Claritin-D or Zyrtec-D.  If you do take one of the above, it is ok to combine that with plain over the counter Mucinex or Allegra or Claritin or Zyrtec.  If for example you are taking Zyrtec -D, you can combine that with Mucinex, but not Mucinex-D.  If you are taking Mucinex-D, you can combine that with plain Allegra or Claritin or Zyrtec.   If you do have Hypertension or palpitations, it is safe to take Coricidin HBP for relief of sinus symptoms.  If not allergic, please take over the counter Tylenol (Acetaminophen) and/or Motrin (Ibuprofen) as directed for control of pain and/or fever.  Please follow up with your primary care doctor or specialist as needed.    If you  smoke, please stop smoking.      Viral Upper Respiratory Illness (Adult)  You have a viral  upper respiratory illness (URI), which is another term for the common cold. This illness is contagious during the first few days. It is spread through the air by coughing and sneezing. It may also be spread by direct contact (touching the sick person and then touching your own eyes, nose, or mouth). Frequent handwashing will decrease risk of spread. Most viral illnesses go away within 7 to 10 days with rest and simple home remedies. Sometimes the illness may last for several weeks. Antibiotics will not kill a virus, and they are generally not prescribed for this condition.    Home care  · If symptoms are severe, rest at home for the first 2 to 3 days. When you resume activity, don't let yourself get too tired.  · Avoid being exposed to cigarette smoke (yours or others).  · You may use acetaminophen or ibuprofen to control pain and fever, unless another medicine was prescribed. (Note: If you have chronic liver or kidney disease, have ever had a stomach ulcer or gastrointestinal bleeding, or are taking blood-thinning medicines, talk with your healthcare provider before using these medicines.) Aspirin should never be given to anyone under 18 years of age who is ill with a viral infection or fever. It may cause severe liver or brain damage.  · Your appetite may be poor, so a light diet is fine. Avoid dehydration by drinking 6 to 8 glasses of fluids per day (water, soft drinks, juices, tea, or soup). Extra fluids will help loosen secretions in the nose and lungs.  · Over-the-counter cold medicines will not shorten the length of time youre sick, but they may be helpful for the following symptoms: cough, sore throat, and nasal and sinus congestion. (Note: Do not use decongestants if you have high blood pressure.)  Follow-up care  Follow up with your healthcare provider, or as advised.  When to seek medical advice  Call your healthcare provider right away if any of these occur:  · Cough with lots of colored sputum  (mucus)  · Severe headache; face, neck, or ear pain  · Difficulty swallowing due to throat pain  · Fever of 100.4°F (38°C)  Call 911, or get immediate medical care  Call emergency services right away if any of these occur:  · Chest pain, shortness of breath, wheezing, or difficulty breathing  · Coughing up blood  · Inability to swallow due to throat pain  Date Last Reviewed: 9/13/2015 © 2000-2017 [employer]. [address] [place] [address], [place] [address]. All rights reserved. This information is not intended as a substitute for professional medical care. Always follow your healthcare professional's instructions.        Nail Fungal Infection  A nail fungal infection changes the way fingernails and toenails look. They may thicken, discolor, change shape, or split. This condition is hard to treat because nails grow slowly and have limited blood supply. The infection often comes back after treatment.  There are 2 types of medicines used to treat this condition:  · Topical anti-fungal medicines. These are applied to the surface of the skin and nail area. These medicines are not very effective because they cant get deep into the nail.  · Oral antifungal medicines. These medicines work better because they go into the nail from the inside out. But the infection may still come back. It may take 9 to 12 months for your nail to look normal again. This means you are cured. You can repeat treatment if needed. Most people take these medicines without any problems. It is rare to stop therapy because of side effects. But your healthcare provider may give you some monitoring tests. Talk about possible side effects with your provider before starting treatment.  If medicines fail, the nail can be removed surgically or chemically. These methods physically remove the fungus from the body. This helps medical treatment be more effective.  Home care  · Use medicines exactly as directed for as long as directed. Treating a fungal  infection can take longer than other kinds of infections.  · Smoking is a risk factor for fungal infection. This is one more reason to quit.  · Wear absorbent socks, and shoes that let your feet breathe. Sweaty feet increase your risk of fungal infection. They also make an existing infection harder to treat.  · Use footwear when in damp public places like swimming pools, gyms, and shower rooms. This will help you avoid the fungus that grows there.  · Don't share nail clippers or scissors with others.  Follow-up care  Follow up with your healthcare provider, or as advised.  When to seek medical advice  Call your healthcare provider right away if any of these occur:  · Skin by the nail becomes red, swollen, painful, or drains pus (a creamy yellow or white liquid)  · Side effects from oral anti-fungal medicines  Date Last Reviewed: 8/1/2016  © 3525-5763 [employer]. [address] [place] [address], [place] [address]. All rights reserved. This information is not intended as a substitute for professional medical care. Always follow your healthcare professional's instructions.

## 2017-12-23 NOTE — PATIENT INSTRUCTIONS
Please drink plenty of fluids.  Please get plenty of rest.  Please return here or go to the Emergency Department for any concerns or worsening of condition.  If you do not have Hypertension or any history of palpitations, it is ok to take over the counter Sudafed or Mucinex D or Allegra-D or Claritin-D or Zyrtec-D.  If you do take one of the above, it is ok to combine that with plain over the counter Mucinex or Allegra or Claritin or Zyrtec.  If for example you are taking Zyrtec -D, you can combine that with Mucinex, but not Mucinex-D.  If you are taking Mucinex-D, you can combine that with plain Allegra or Claritin or Zyrtec.   If you do have Hypertension or palpitations, it is safe to take Coricidin HBP for relief of sinus symptoms.  If not allergic, please take over the counter Tylenol (Acetaminophen) and/or Motrin (Ibuprofen) as directed for control of pain and/or fever.  Please follow up with your primary care doctor or specialist as needed.    If you  smoke, please stop smoking.      Viral Upper Respiratory Illness (Adult)  You have a viral upper respiratory illness (URI), which is another term for the common cold. This illness is contagious during the first few days. It is spread through the air by coughing and sneezing. It may also be spread by direct contact (touching the sick person and then touching your own eyes, nose, or mouth). Frequent handwashing will decrease risk of spread. Most viral illnesses go away within 7 to 10 days with rest and simple home remedies. Sometimes the illness may last for several weeks. Antibiotics will not kill a virus, and they are generally not prescribed for this condition.    Home care  · If symptoms are severe, rest at home for the first 2 to 3 days. When you resume activity, don't let yourself get too tired.  · Avoid being exposed to cigarette smoke (yours or others).  · You may use acetaminophen or ibuprofen to control pain and fever, unless another medicine was  prescribed. (Note: If you have chronic liver or kidney disease, have ever had a stomach ulcer or gastrointestinal bleeding, or are taking blood-thinning medicines, talk with your healthcare provider before using these medicines.) Aspirin should never be given to anyone under 18 years of age who is ill with a viral infection or fever. It may cause severe liver or brain damage.  · Your appetite may be poor, so a light diet is fine. Avoid dehydration by drinking 6 to 8 glasses of fluids per day (water, soft drinks, juices, tea, or soup). Extra fluids will help loosen secretions in the nose and lungs.  · Over-the-counter cold medicines will not shorten the length of time youre sick, but they may be helpful for the following symptoms: cough, sore throat, and nasal and sinus congestion. (Note: Do not use decongestants if you have high blood pressure.)  Follow-up care  Follow up with your healthcare provider, or as advised.  When to seek medical advice  Call your healthcare provider right away if any of these occur:  · Cough with lots of colored sputum (mucus)  · Severe headache; face, neck, or ear pain  · Difficulty swallowing due to throat pain  · Fever of 100.4°F (38°C)  Call 911, or get immediate medical care  Call emergency services right away if any of these occur:  · Chest pain, shortness of breath, wheezing, or difficulty breathing  · Coughing up blood  · Inability to swallow due to throat pain  Date Last Reviewed: 9/13/2015  © 7226-8556 [employer]. [address] [place] [address]. All rights reserved. This information is not intended as a substitute for professional medical care. Always follow your healthcare professional's instructions.        Nail Fungal Infection  A nail fungal infection changes the way fingernails and toenails look. They may thicken, discolor, change shape, or split. This condition is hard to treat because nails grow slowly and have limited blood supply. The infection  often comes back after treatment.  There are 2 types of medicines used to treat this condition:  · Topical anti-fungal medicines. These are applied to the surface of the skin and nail area. These medicines are not very effective because they cant get deep into the nail.  · Oral antifungal medicines. These medicines work better because they go into the nail from the inside out. But the infection may still come back. It may take 9 to 12 months for your nail to look normal again. This means you are cured. You can repeat treatment if needed. Most people take these medicines without any problems. It is rare to stop therapy because of side effects. But your healthcare provider may give you some monitoring tests. Talk about possible side effects with your provider before starting treatment.  If medicines fail, the nail can be removed surgically or chemically. These methods physically remove the fungus from the body. This helps medical treatment be more effective.  Home care  · Use medicines exactly as directed for as long as directed. Treating a fungal infection can take longer than other kinds of infections.  · Smoking is a risk factor for fungal infection. This is one more reason to quit.  · Wear absorbent socks, and shoes that let your feet breathe. Sweaty feet increase your risk of fungal infection. They also make an existing infection harder to treat.  · Use footwear when in damp public places like swimming pools, gyms, and shower rooms. This will help you avoid the fungus that grows there.  · Don't share nail clippers or scissors with others.  Follow-up care  Follow up with your healthcare provider, or as advised.  When to seek medical advice  Call your healthcare provider right away if any of these occur:  · Skin by the nail becomes red, swollen, painful, or drains pus (a creamy yellow or white liquid)  · Side effects from oral anti-fungal medicines  Date Last Reviewed: 8/1/2016  © 1359-3826 [employer] [address] [place] [address]. All rights reserved. This information is not intended as a substitute for professional medical care. Always follow your healthcare professional's instructions.

## 2018-03-04 ENCOUNTER — PATIENT MESSAGE (OUTPATIENT)
Dept: ADMINISTRATIVE | Facility: OTHER | Age: 68
End: 2018-03-04

## 2018-03-05 ENCOUNTER — TELEPHONE (OUTPATIENT)
Dept: SURGERY | Facility: CLINIC | Age: 68
End: 2018-03-05

## 2018-03-05 NOTE — TELEPHONE ENCOUNTER
----- Message from Ramandeep Villar sent at 3/5/2018  8:09 AM CST -----  Contact: Pt.Self   Pt. States she would like to speak with nurse  in reference to having breast pain under (left) breast by rig cage and needs to know who she needs to see first please call Pt. Back @ [phone] Thank You :)

## 2018-03-05 NOTE — TELEPHONE ENCOUNTER
"Return call to pt. States she is having pain under left breast near rib cage. Wanted to know what she could take or who she should see about this. States she should have f/u w/Lacy in January, but did not receive a notice. Pt scheduled for f/u w/Lacy 3/12/18. Pt informed that it sounds like this may be costal chondritis, but without an exam, can not be certain. Informed pt that if it is this, usually NSAID's will relieve. Pt sates she had a screen placed in her stomach a couple of years ago and is not to take NSAID's, although admits that she does take them occasionally. Advised pt to contact her MD that placed the screen to see what type of anti-inflammatory drug, if any would be safe for pt to take. Pt states she does not see that MD anymore as he's located in [place]. Advised pt not to take the OTC NSAID"s until she knows which would be safe related to her stomach screen. Pt states she will just take it as he has taken it in the past . Appt for f/u w/Lacy scheduled 3/12/18.  "

## 2018-03-12 ENCOUNTER — OFFICE VISIT (OUTPATIENT)
Dept: SURGERY | Facility: CLINIC | Age: 68
End: 2018-03-12
Payer: MEDICARE

## 2018-03-12 VITALS
HEART RATE: 71 BPM | WEIGHT: 153.31 LBS | TEMPERATURE: 98 F | BODY MASS INDEX: 24.06 KG/M2 | SYSTOLIC BLOOD PRESSURE: 129 MMHG | DIASTOLIC BLOOD PRESSURE: 65 MMHG | HEIGHT: 67 IN

## 2018-03-12 DIAGNOSIS — Z85.3 PERSONAL HISTORY OF BREAST CANCER: Primary | ICD-10-CM

## 2018-03-12 PROCEDURE — 99999 PR PBB SHADOW E&M-EST. PATIENT-LVL IV: CPT | Mod: PBBFAC,,, | Performed by: NURSE PRACTITIONER

## 2018-03-12 PROCEDURE — 99213 OFFICE O/P EST LOW 20 MIN: CPT | Mod: S$GLB,,, | Performed by: NURSE PRACTITIONER

## 2018-03-12 NOTE — PROGRESS NOTES
Subjective:      Patient ID: Tamra Benjamin is a 67 y.o. female.    Chief Complaint: No chief complaint on file.      HPI: (PF, EPF - 1-3) (Detailed, Comp, - 4)patient presents today for breast cancer surveillance. Currently denies breast mass, pain, nipple discharge, skin changes, unexplained weight loss, new onset bone pain.   She request all breast imaging to be done on the [facility] with a particular mammo tech,7-, no abnormality reported     Diagnostic mammogram and left US 7-7-2016 with focal density left breast and change in echogenicity  Core biopsy left breast 7- with DCIS, ER/PA negative  Left lumpectomy 7- with residual 1.2cm area of DCIS, 1mm posterior margin, adjuvant XRT      Review of Systems   Constitutional: Negative for appetite change, fatigue and fever.   Respiratory: Negative for cough and shortness of breath.    Cardiovascular: Negative for chest pain.   Musculoskeletal: Negative for back pain.     Objective:   Physical Exam   Pulmonary/Chest: Right breast exhibits no inverted nipple, no mass, no nipple discharge, no skin change and no tenderness. Left breast exhibits no inverted nipple, no mass, no nipple discharge, no skin change and no tenderness. Breasts are symmetrical. There is no breast swelling.   Lymphadenopathy:     She has no cervical adenopathy.     She has no axillary adenopathy.        Right: No supraclavicular adenopathy present.        Left: No supraclavicular adenopathy present.     Assessment:       1. 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

## 2018-04-09 ENCOUNTER — PES CALL (OUTPATIENT)
Dept: ADMINISTRATIVE | Facility: CLINIC | Age: 68
End: 2018-04-09

## 2018-06-07 ENCOUNTER — PATIENT MESSAGE (OUTPATIENT)
Dept: SURGERY | Facility: CLINIC | Age: 68
End: 2018-06-07

## 2018-06-11 ENCOUNTER — TELEPHONE (OUTPATIENT)
Dept: SURGERY | Facility: CLINIC | Age: 68
End: 2018-06-11

## 2018-06-11 NOTE — TELEPHONE ENCOUNTER
----- Message from Kane Zaragoza sent at 6/11/2018 11:25 AM CDT -----  Contact: PT  Pt is requesting a call back in regards to mammogram.    Pt can be reached at [phone].

## 2018-06-11 NOTE — TELEPHONE ENCOUNTER
Returned the patient call regarding the message below.  Tried to scheduled the patient for mammogram at  [facility] per the patient request.  Date note available patient is in contact with the [facility] regarding scheduling the mammogram.

## 2018-07-09 RX ORDER — LEVOTHYROXINE SODIUM 100 UG/1
TABLET ORAL
Qty: 90 TABLET | Refills: 0 | Status: SHIPPED | OUTPATIENT
Start: 2018-07-09 | End: 2018-10-05 | Stop reason: SDUPTHER

## 2018-08-06 ENCOUNTER — HOSPITAL ENCOUNTER (OUTPATIENT)
Dept: RADIOLOGY | Facility: HOSPITAL | Age: 68
Discharge: HOME | End: 2018-08-06
Attending: NURSE PRACTITIONER
Payer: MEDICARE

## 2018-08-06 ENCOUNTER — TELEPHONE (OUTPATIENT)
Dept: SURGERY | Facility: CLINIC | Age: 68
End: 2018-08-06

## 2018-08-06 DIAGNOSIS — Z85.3 PERSONAL HISTORY OF BREAST CANCER: ICD-10-CM

## 2018-08-06 PROCEDURE — 77062 BREAST TOMOSYNTHESIS BI: CPT | Mod: 26,,, | Performed by: RADIOLOGY

## 2018-08-06 PROCEDURE — 77066 DX MAMMO INCL CAD BI: CPT | Mod: 26,,, | Performed by: RADIOLOGY

## 2018-08-06 PROCEDURE — 77066 DX MAMMO INCL CAD BI: CPT | Mod: TC,PO

## 2018-08-06 NOTE — TELEPHONE ENCOUNTER
Contacted the patient regarding the message below.  The patient did not answer, message left for the patient to contact our office regarding this matter.

## 2018-10-05 RX ORDER — LEVOTHYROXINE SODIUM 100 UG/1
TABLET ORAL
Qty: 90 TABLET | Refills: 0 | Status: SHIPPED | OUTPATIENT
Start: 2018-10-05 | End: 2018-10-22 | Stop reason: DRUGHIGH

## 2018-10-19 ENCOUNTER — OFFICE VISIT (OUTPATIENT)
Dept: ENDOCRINOLOGY | Facility: CLINIC | Age: 68
End: 2018-10-19
Payer: MEDICARE

## 2018-10-19 ENCOUNTER — LAB VISIT (OUTPATIENT)
Dept: LAB | Facility: HOSPITAL | Age: 68
End: 2018-10-19
Payer: MEDICARE

## 2018-10-19 VITALS
HEIGHT: 67 IN | WEIGHT: 149.06 LBS | BODY MASS INDEX: 23.39 KG/M2 | RESPIRATION RATE: 16 BRPM | DIASTOLIC BLOOD PRESSURE: 78 MMHG | HEART RATE: 64 BPM | SYSTOLIC BLOOD PRESSURE: 138 MMHG

## 2018-10-19 DIAGNOSIS — E55.9 VITAMIN D DEFICIENCY: ICD-10-CM

## 2018-10-19 DIAGNOSIS — E03.8 OTHER SPECIFIED HYPOTHYROIDISM: ICD-10-CM

## 2018-10-19 DIAGNOSIS — E04.2 MULTIPLE THYROID NODULES: ICD-10-CM

## 2018-10-19 DIAGNOSIS — M19.90 ARTHRITIS: ICD-10-CM

## 2018-10-19 DIAGNOSIS — E03.8 OTHER SPECIFIED HYPOTHYROIDISM: Primary | ICD-10-CM

## 2018-10-19 DIAGNOSIS — Z78.0 POSTMENOPAUSAL: ICD-10-CM

## 2018-10-19 LAB
25(OH)D3+25(OH)D2 SERPL-MCNC: 39 NG/ML
T4 FREE SERPL-MCNC: 1.68 NG/DL
TSH SERPL DL<=0.005 MIU/L-ACNC: 0.04 UIU/ML

## 2018-10-19 PROCEDURE — 99214 OFFICE O/P EST MOD 30 MIN: CPT | Mod: PBBFAC | Performed by: INTERNAL MEDICINE

## 2018-10-19 PROCEDURE — 99999 PR PBB SHADOW E&M-EST. PATIENT-LVL IV: CPT | Mod: PBBFAC,,, | Performed by: INTERNAL MEDICINE

## 2018-10-19 PROCEDURE — 84439 ASSAY OF FREE THYROXINE: CPT

## 2018-10-19 PROCEDURE — 1101F PT FALLS ASSESS-DOCD LE1/YR: CPT | Mod: CPTII,,, | Performed by: INTERNAL MEDICINE

## 2018-10-19 PROCEDURE — 82306 VITAMIN D 25 HYDROXY: CPT

## 2018-10-19 PROCEDURE — 36415 COLL VENOUS BLD VENIPUNCTURE: CPT

## 2018-10-19 PROCEDURE — 99214 OFFICE O/P EST MOD 30 MIN: CPT | Mod: S$PBB,,, | Performed by: INTERNAL MEDICINE

## 2018-10-19 PROCEDURE — 84443 ASSAY THYROID STIM HORMONE: CPT

## 2018-10-19 NOTE — PROGRESS NOTES
Subjective:      Patient ID: Tamra Benjamin is a 68 y.o. female.    Chief Complaint:  Hypothyroidism    History of Present Illness  Ms. Astorga returns to clinic today for Hypothyroidism and MNG follow up   Last office visit with Dr. Wetzel in 03/2017     She is being seen by me for the first time today     With regards to the Hypothyroidism   Diagnosed several years ago   TPO negative in 2013     Currently she is taking Levothyroxine 100 mcg once daily   Patient states she takes LT4 with either water or tea   She waits 10-15 minutes before eating or taking other medications     Most recently she has noticed an irregular heartbeat   Also being evaluated by Cardiology at [facility]   She recently wore a holter monitor x 1 month and states she is due to follow up with Cardiology in the near future for the results     She denies frequent bowel movements   Denies tremors   Denies anxiety   Denies weight loss   Denies difficulty concentrating   Denies thin brittle nails     With regards to MNG:   Diagnosed several years ago   Had FNA biopsy of right thyroid nodule in 2013   SPECIMEN  1) FNA RIGHT Thyroid (Clinician)  FINAL PATHOLOGIC DIAGNOSIS  THYROID, RIGHT (FINE NEEDLE ASPIRATION):  -Benign colloid nodule  Diagnosed by: Katia Gale M.D.  (Electronically Signed: 2013-03-13 11:10:14)  Microscopic Examination  Smears are mildly cellular and show flat sheets and organize groups of follicular cells with mild anisonucleosis and  delicate to granular cytoplasm. Scattered Hurthle cell changes are seen. The background is composed of thin colloid.    She has had serial thyroid USS in 2014, and 2017     She denies dysphagia   Denies voice changes   She endorses shortness of breath in the recumbent position. States she has to sleep with 2 pillows     She denies family history of thyroid cancer     The patient had breast cancer in 2016 and underwent 38 radiation treatments     She denies radiation exposure to her  head, face or neck area     Denies neck tenderness or pain   She has not noticed an increase in her neck size       Review of Systems   Constitutional: Positive for fatigue. Negative for unexpected weight change.   HENT: Negative for sore throat, trouble swallowing and voice change.    Respiratory: Negative for shortness of breath.    Cardiovascular: Positive for chest pain and palpitations.   Gastrointestinal: Positive for constipation. Negative for diarrhea.   Endocrine: Negative for cold intolerance and heat intolerance.   Musculoskeletal: Positive for arthralgias. Negative for back pain.        Hands   Neurological: Negative for headaches.   Psychiatric/Behavioral: The patient is not nervous/anxious.        Objective:   Physical Exam   Constitutional: She is oriented to person, place, and time. She appears well-developed and well-nourished.   Neck: No thyromegaly present.   Cardiovascular: Normal rate and regular rhythm.   No murmur heard.  Pulmonary/Chest: Effort normal.   Musculoskeletal: She exhibits deformity (both hands). She exhibits no edema.   Lymphadenopathy:     She has no cervical adenopathy.   Neurological: She is alert and oriented to person, place, and time. She has normal reflexes. She displays normal reflexes.   Skin: Skin is warm and dry.   Psychiatric: She has a normal mood and affect. Her behavior is normal.   Nursing note and vitals reviewed.    Lab Review:   Results for CELINE BUSCH (MRN [id number]) as of 10/19/2018 16:35   Ref. Range 10/2/2017 08:40   TSH Latest Ref Range: 0.400 - 4.000 uIU/mL 0.713   Free T4 Latest Ref Range: 0.71 - 1.51 ng/dL 1.05     Results for CELINE BUSCH (MRN [id number]) as of 10/19/2018 16:35   Ref. Range 2/6/2013 10:52   Thyroperoxidase Antibodies Latest Ref Range: 0 - 6 IU/mL < 6.0     Results for CELINE BUSCH (MRN [id number]) as of 10/19/2018 16:35   Ref. Range 7/25/2014 08:15   Vit D, 25-Hydroxy Latest Ref Range: 30 - 96 ng/mL 26 (L)      Results for CELINE BUSCH (MRN [id number]) as of 10/19/2018 16:35   Ref. Range 10/2/2017 08:40   Sodium Latest Ref Range: 136 - 145 mmol/L 142   Potassium Latest Ref Range: 3.5 - 5.1 mmol/L 4.4   Chloride Latest Ref Range: 95 - 110 mmol/L 110   CO2 Latest Ref Range: 23 - 29 mmol/L 21 (L)   Anion Gap Latest Ref Range: 8 - 16 mmol/L 11   BUN, Bld Latest Ref Range: 8 - 23 mg/dL 15   Creatinine Latest Ref Range: 0.5 - 1.4 mg/dL 0.8   eGFR if non African American Latest Ref Range: >60 mL/min/1.73 m^2 >60   eGFR if  Latest Ref Range: >60 mL/min/1.73 m^2 >60   Glucose Latest Ref Range: 70 - 110 mg/dL 98   Calcium Latest Ref Range: 8.7 - 10.5 mg/dL 9.0   Alkaline Phosphatase Latest Ref Range: 55 - 135 U/L 67   Total Protein Latest Ref Range: 6.0 - 8.4 g/dL 6.9   Albumin Latest Ref Range: 3.5 - 5.2 g/dL 3.7   Total Bilirubin Latest Ref Range: 0.1 - 1.0 mg/dL 0.4   AST Latest Ref Range: 10 - 40 U/L 16   ALT Latest Ref Range: 10 - 44 U/L 14     Assessment:     1. Other specified hypothyroidism  TSH    T4, free   2. Multiple thyroid nodules  US Soft Tissue Head Neck Thyroid    CANCELED: US Soft Tissue Head Neck Thyroid   3. Postmenopausal  DXA Bone Density Spine And Hip   4. Vitamin D deficiency  Vitamin D   5. Arthritis  Ambulatory Referral to Rheumatology       Plan:     1. Hypothyroidism   -- labs today   -- goal of therapy is a normal TSH  -- reviewed usual times of thyroid hormone changes   -- reinforced to take LT4 as prescribed. On an empty stomach with water ideally an hour before eating or taking other medications   -- avoid exogenous hyperthyroidism as this can accelerate bone loss and increase risk of CV complications     2. Thyroid nodules   -- repeat thyroid USS   -- reviewed indications for repeat FNA; interval change, compressive symptoms     3. Postmenopausal   -- obtain BMD   -- will message with results     4. Vitamin d def   -- lab today     5. Arthritis   -- refer to Rheumatology for  further evaluation

## 2018-10-22 ENCOUNTER — TELEPHONE (OUTPATIENT)
Dept: ENDOCRINOLOGY | Facility: CLINIC | Age: 68
End: 2018-10-22

## 2018-10-22 ENCOUNTER — PATIENT MESSAGE (OUTPATIENT)
Dept: ENDOCRINOLOGY | Facility: CLINIC | Age: 68
End: 2018-10-22

## 2018-10-22 DIAGNOSIS — E03.8 OTHER SPECIFIED HYPOTHYROIDISM: Primary | ICD-10-CM

## 2018-10-22 RX ORDER — LEVOTHYROXINE SODIUM 88 UG/1
88 TABLET ORAL
Qty: 30 TABLET | Refills: 3 | Status: SHIPPED | OUTPATIENT
Start: 2018-10-22 | End: 2018-12-03

## 2018-10-29 ENCOUNTER — HOSPITAL ENCOUNTER (OUTPATIENT)
Dept: RADIOLOGY | Facility: HOSPITAL | Age: 68
Discharge: HOME | End: 2018-10-29
Attending: INTERNAL MEDICINE
Payer: MEDICARE

## 2018-10-29 ENCOUNTER — HOSPITAL ENCOUNTER (OUTPATIENT)
Dept: RADIOLOGY | Facility: CLINIC | Age: 68
Discharge: HOME | End: 2018-10-29
Attending: INTERNAL MEDICINE
Payer: MEDICARE

## 2018-10-29 DIAGNOSIS — E04.2 MULTIPLE THYROID NODULES: ICD-10-CM

## 2018-10-29 DIAGNOSIS — Z78.0 POSTMENOPAUSAL: ICD-10-CM

## 2018-10-29 PROCEDURE — 77080 DXA BONE DENSITY AXIAL: CPT | Mod: TC

## 2018-10-29 PROCEDURE — 76536 US EXAM OF HEAD AND NECK: CPT | Mod: 26,,, | Performed by: INTERNAL MEDICINE

## 2018-10-29 PROCEDURE — 77080 DXA BONE DENSITY AXIAL: CPT | Mod: 26,,, | Performed by: INTERNAL MEDICINE

## 2018-10-29 PROCEDURE — 76536 US EXAM OF HEAD AND NECK: CPT | Mod: TC

## 2018-11-20 ENCOUNTER — PATIENT MESSAGE (OUTPATIENT)
Dept: SURGERY | Facility: CLINIC | Age: 68
End: 2018-11-20

## 2018-12-03 ENCOUNTER — LAB VISIT (OUTPATIENT)
Dept: LAB | Facility: HOSPITAL | Age: 68
End: 2018-12-03
Attending: INTERNAL MEDICINE
Payer: MEDICARE

## 2018-12-03 ENCOUNTER — TELEPHONE (OUTPATIENT)
Dept: ENDOCRINOLOGY | Facility: CLINIC | Age: 68
End: 2018-12-03

## 2018-12-03 DIAGNOSIS — E03.8 OTHER SPECIFIED HYPOTHYROIDISM: ICD-10-CM

## 2018-12-03 LAB
T4 FREE SERPL-MCNC: 1.47 NG/DL
TSH SERPL DL<=0.005 MIU/L-ACNC: 0.1 UIU/ML

## 2018-12-03 PROCEDURE — 84443 ASSAY THYROID STIM HORMONE: CPT

## 2018-12-03 PROCEDURE — 84439 ASSAY OF FREE THYROXINE: CPT

## 2018-12-03 PROCEDURE — 36415 COLL VENOUS BLD VENIPUNCTURE: CPT | Mod: PO

## 2018-12-03 RX ORDER — LEVOTHYROXINE SODIUM 88 UG/1
88 TABLET ORAL
Qty: 30 TABLET | Refills: 3
Start: 2018-12-03 | End: 2019-01-02 | Stop reason: SDUPTHER

## 2018-12-10 NOTE — PROGRESS NOTES
Subjective:      Patient ID: Tamra Benjamin is a 68 y.o. female.    Chief Complaint: Follow-up (Follow up CBE & MMG done 8/20/18)      HPI: (PF, EPF - 1-3) (Detailed, Comp, - 4) patient presents today for breast cancer surveillance. Currently denies breast mass, pain, nipple discharge, skin changes, unexplained weight loss, new onset bone pain.   She request all breast imaging to be done on the [facility] with a particular mammo tech,Diagnostic mammogram 8-6-2018 with no abnormality reported     Core biopsy left breast 7- with DCIS, [place] negative  Left lumpectomy 7- with residual 1.2cm area of DCIS, 1mm posterior margin, adjuvant XRT      Review of Systems   Constitutional: Negative for appetite change and fatigue.   Respiratory: Negative for cough and shortness of breath.    Cardiovascular: Negative for chest pain.   Musculoskeletal: Negative for back pain.     Objective:   Physical Exam   Pulmonary/Chest: Right breast exhibits no inverted nipple, no mass, no nipple discharge, no skin change and no tenderness. Left breast exhibits no inverted nipple, no mass, no nipple discharge, no skin change and no tenderness. Breasts are symmetrical. There is no breast swelling.   Lumpectomy scar left lower breast at 7 o'clock with minimal volume loss, no mass appreciated    Lymphadenopathy:     She has no cervical adenopathy.     She has no axillary adenopathy.        Right: No supraclavicular adenopathy present.        Left: No supraclavicular adenopathy present.     Assessment:       1. Personal history of breast cancer        Plan:       Clinically SHAUN, now almost 2.5 years post diagnosis of stage 0 left breast cancer.   Was advised on surveillance recommendations, CBE every 6 months for the first three years and then annually, mammogram annually.   Informed patient I will be leaving [facility] this week  She will need to return in August for CBE and mammogram and was informed to call the breast center for  which provider to see at that time   Call for any interval breast changes or concerns

## 2018-12-11 ENCOUNTER — OFFICE VISIT (OUTPATIENT)
Dept: SURGERY | Facility: CLINIC | Age: 68
End: 2018-12-11
Payer: MEDICARE

## 2018-12-11 VITALS
SYSTOLIC BLOOD PRESSURE: 128 MMHG | TEMPERATURE: 99 F | DIASTOLIC BLOOD PRESSURE: 69 MMHG | HEIGHT: 67 IN | HEART RATE: 67 BPM | BODY MASS INDEX: 23.39 KG/M2 | WEIGHT: 149 LBS

## 2018-12-11 DIAGNOSIS — Z85.3 PERSONAL HISTORY OF BREAST CANCER: Primary | ICD-10-CM

## 2018-12-11 PROCEDURE — 99213 OFFICE O/P EST LOW 20 MIN: CPT | Mod: S$GLB,,, | Performed by: NURSE PRACTITIONER

## 2018-12-11 PROCEDURE — 99999 PR PBB SHADOW E&M-EST. PATIENT-LVL III: CPT | Mod: PBBFAC,,, | Performed by: NURSE PRACTITIONER

## 2018-12-11 PROCEDURE — 1101F PT FALLS ASSESS-DOCD LE1/YR: CPT | Mod: CPTII,S$GLB,, | Performed by: NURSE PRACTITIONER

## 2019-01-01 DIAGNOSIS — E03.8 OTHER SPECIFIED HYPOTHYROIDISM: ICD-10-CM

## 2019-01-01 DIAGNOSIS — E03.9 HYPOTHYROIDISM, UNSPECIFIED TYPE: Primary | ICD-10-CM

## 2019-01-02 RX ORDER — LEVOTHYROXINE SODIUM 100 UG/1
TABLET ORAL
Qty: 90 TABLET | Refills: 2 | OUTPATIENT
Start: 2019-01-02

## 2019-01-02 RX ORDER — LEVOTHYROXINE SODIUM 88 UG/1
88 TABLET ORAL
Qty: 30 TABLET | Refills: 11
Start: 2019-01-02 | End: 2019-01-21 | Stop reason: SDUPTHER

## 2019-01-14 ENCOUNTER — LAB VISIT (OUTPATIENT)
Dept: LAB | Facility: HOSPITAL | Age: 69
End: 2019-01-14
Attending: INTERNAL MEDICINE
Payer: MEDICARE

## 2019-01-14 DIAGNOSIS — E03.8 OTHER SPECIFIED HYPOTHYROIDISM: ICD-10-CM

## 2019-01-14 LAB — TSH SERPL DL<=0.005 MIU/L-ACNC: 0.45 UIU/ML

## 2019-01-14 PROCEDURE — 84443 ASSAY THYROID STIM HORMONE: CPT | Mod: HCNC

## 2019-01-14 PROCEDURE — 36415 COLL VENOUS BLD VENIPUNCTURE: CPT | Mod: HCNC,PO

## 2019-01-18 ENCOUNTER — PATIENT MESSAGE (OUTPATIENT)
Dept: INTERNAL MEDICINE | Facility: CLINIC | Age: 69
End: 2019-01-18

## 2019-01-21 DIAGNOSIS — E03.8 OTHER SPECIFIED HYPOTHYROIDISM: ICD-10-CM

## 2019-01-21 RX ORDER — LEVOTHYROXINE SODIUM 88 UG/1
88 TABLET ORAL
Qty: 90 TABLET | Refills: 3 | Status: SHIPPED | OUTPATIENT
Start: 2019-01-21 | End: 2020-09-09

## 2019-01-21 NOTE — TELEPHONE ENCOUNTER
----- Message from Gogo Hernández MA sent at 1/21/2019 10:06 AM CST -----  Contact: self [phone]      ----- Message -----  From: Nancy Davey  Sent: 1/21/2019   9:35 AM  To: Lela Boyd Staff    ..Needs Advice    Reason for call:        Communication Preference:phone     Additional Information:pt states she needs a 90 day supply for her levothyroxine (SYNTHROID) 88 MCG tablet states so she doesn't have to keep calling when she runs out please call back to discuss.

## 2019-01-30 ENCOUNTER — OFFICE VISIT (OUTPATIENT)
Dept: URGENT CARE | Facility: CLINIC | Age: 69
End: 2019-01-30
Payer: MEDICARE

## 2019-01-30 VITALS
RESPIRATION RATE: 19 BRPM | SYSTOLIC BLOOD PRESSURE: 131 MMHG | BODY MASS INDEX: 23.39 KG/M2 | HEART RATE: 86 BPM | HEIGHT: 67 IN | DIASTOLIC BLOOD PRESSURE: 74 MMHG | WEIGHT: 149 LBS | OXYGEN SATURATION: 99 % | TEMPERATURE: 98 F

## 2019-01-30 DIAGNOSIS — J06.9 VIRAL URI: Primary | ICD-10-CM

## 2019-01-30 DIAGNOSIS — R09.81 NASAL CONGESTION: ICD-10-CM

## 2019-01-30 PROCEDURE — 99214 OFFICE O/P EST MOD 30 MIN: CPT | Mod: 25,S$GLB,, | Performed by: NURSE PRACTITIONER

## 2019-01-30 PROCEDURE — 1101F PT FALLS ASSESS-DOCD LE1/YR: CPT | Mod: CPTII,S$GLB,, | Performed by: NURSE PRACTITIONER

## 2019-01-30 PROCEDURE — 96372 THER/PROPH/DIAG INJ SC/IM: CPT | Mod: S$GLB,,, | Performed by: NURSE PRACTITIONER

## 2019-01-30 PROCEDURE — 1101F PR PT FALLS ASSESS DOC 0-1 FALLS W/OUT INJ PAST YR: ICD-10-PCS | Mod: CPTII,S$GLB,, | Performed by: NURSE PRACTITIONER

## 2019-01-30 PROCEDURE — 99214 PR OFFICE/OUTPT VISIT, EST, LEVL IV, 30-39 MIN: ICD-10-PCS | Mod: 25,S$GLB,, | Performed by: NURSE PRACTITIONER

## 2019-01-30 PROCEDURE — 96372 PR INJECTION,THERAP/PROPH/DIAG2ST, IM OR SUBCUT: ICD-10-PCS | Mod: S$GLB,,, | Performed by: NURSE PRACTITIONER

## 2019-01-30 RX ORDER — BETAMETHASONE SODIUM PHOSPHATE AND BETAMETHASONE ACETATE 3; 3 MG/ML; MG/ML
6 INJECTION, SUSPENSION INTRA-ARTICULAR; INTRALESIONAL; INTRAMUSCULAR; SOFT TISSUE
Status: COMPLETED | OUTPATIENT
Start: 2019-01-30 | End: 2019-01-30

## 2019-01-30 RX ADMIN — BETAMETHASONE SODIUM PHOSPHATE AND BETAMETHASONE ACETATE 6 MG: 3; 3 INJECTION, SUSPENSION INTRA-ARTICULAR; INTRALESIONAL; INTRAMUSCULAR; SOFT TISSUE at 06:01

## 2019-01-31 NOTE — PROGRESS NOTES
"Subjective:       Patient ID: Tamra Benjamin is a 68 y.o. female.    Vitals:  height is 5' 7" (1.702 m) and weight is 67.6 kg (149 lb). Her oral temperature is 98.4 °F (36.9 °C). Her blood pressure is 131/74 and her pulse is 86. Her respiration is 19 and oxygen saturation is 99%.     Chief Complaint: Sinus Problem    Sinus Problem   This is a new problem. The current episode started in the past 7 days. The problem has been gradually worsening since onset. There has been no fever. Her pain is at a severity of 4/10. The pain is mild. Associated symptoms include congestion. Pertinent negatives include no chills, coughing, headaches, shortness of breath or sore throat. (Nasal congestion, green phlegm) Treatments tried: Mucinex, claritin. The treatment provided no relief.       Constitution: Negative for chills, fatigue and fever.   HENT: Positive for congestion. Negative for sore throat.    Neck: Negative for painful lymph nodes.   Cardiovascular: Negative for chest pain and leg swelling.   Eyes: Negative for double vision and blurred vision.   Respiratory: Negative for cough and shortness of breath.    Gastrointestinal: Negative for nausea, vomiting and diarrhea.   Genitourinary: Negative for dysuria, frequency, urgency and history of kidney stones.   Musculoskeletal: Negative for joint pain, joint swelling, muscle cramps and muscle ache.   Skin: Negative for color change, pale, rash and bruising.   Allergic/Immunologic: Negative for seasonal allergies.   Neurological: Negative for dizziness, history of vertigo, light-headedness, passing out and headaches.   Hematologic/Lymphatic: Negative for swollen lymph nodes.   Psychiatric/Behavioral: Negative for nervous/anxious, sleep disturbance and depression. The patient is not nervous/anxious.        Objective:      Physical Exam   Constitutional: She is oriented to person, place, and time. She appears well-developed and well-nourished. She is cooperative.  Non-toxic " "appearance. She does not appear ill. No distress.   HENT:   Head: Normocephalic and atraumatic.   Right Ear: Hearing, tympanic membrane, external ear and ear canal normal.   Left Ear: Hearing, tympanic membrane, external ear and ear canal normal.   Nose: Rhinorrhea present. No mucosal edema or nasal deformity. No epistaxis. Right sinus exhibits no maxillary sinus tenderness and no frontal sinus tenderness. Left sinus exhibits no maxillary sinus tenderness and no frontal sinus tenderness.   Mouth/Throat: Uvula is midline, oropharynx is clear and moist and mucous membranes are normal. No trismus in the jaw. Normal dentition. No uvula swelling. No posterior oropharyngeal erythema.   Eyes: Conjunctivae and lids are normal. No scleral icterus.   Sclera clear bilat   Neck: Trachea normal, full passive range of motion without pain and phonation normal. Neck supple.   Cardiovascular: Normal rate, regular rhythm, normal heart sounds, intact distal pulses and normal pulses.   Pulmonary/Chest: Effort normal and breath sounds normal. No respiratory distress.   Abdominal: Soft. Normal appearance and bowel sounds are normal. She exhibits no distension. There is no tenderness.   Musculoskeletal: Normal range of motion. She exhibits no edema or deformity.   Neurological: She is alert and oriented to person, place, and time. She exhibits normal muscle tone. Coordination normal.   Skin: Skin is warm, dry and intact. She is not diaphoretic. No pallor.   Psychiatric: She has a normal mood and affect. Her speech is normal and behavior is normal. Judgment and thought content normal. Cognition and memory are normal.   Nursing note and vitals reviewed.      Assessment:       1. Viral URI    2. Nasal congestion        Plan:         Upon discussing plan of care with patient she is upset that she is not receiving an antibiotic. Educated at length on difference between viral and bacterial infections. 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     Viral URI  -     betamethasone acetate-betamethasone sodium phosphate injection 6 mg    Nasal congestion  -     betamethasone acetate-betamethasone sodium phosphate injection 6 mg            Patient Instructions   START TAKING A DECONGESTANT AS DISCUSSED AND USE FLONASE DAILY ESPECIALLY DURING THE WEATHER CHANGES. THE DECONGESTANT CAN BE FOUND AT YOUR NEAREST PHARMACY BEHIND THE COUNTER.     You must understand that you've received an Urgent Care treatment only and that you may be released before all your medical problems are known or treated. You, the patient, will arrange for follow up care as instructed.  If your condition worsens we recommend that you receive another evaluation at the emergency room immediately or contact your primary medical clinics after hours call service to discuss your concerns.  Please return here or go to the Emergency Department for any concerns or worsening of condition.      Viral Upper Respiratory Illness (Adult)  You have a viral upper respiratory illness (URI), which is another term for the common cold. This illness is contagious during the first few days. It is spread through the air by coughing and sneezing. It may also be spread by direct contact (touching the sick person and then touching your own eyes, nose, or mouth). Frequent handwashing will decrease risk of spread. Most viral illnesses go away within 7 to 10 days with rest and simple home remedies. Sometimes the illness may last for several weeks. Antibiotics will not kill a virus, and they are generally not prescribed for this condition.    Home care  · If symptoms are severe, rest at home for the first 2 to 3 days. When you resume activity, don't let yourself get too tired.  · Avoid being exposed to cigarette smoke (yours or others).  · You may use acetaminophen or ibuprofen to control pain and fever, unless another medicine was prescribed. (Note: If you have chronic liver or kidney disease, have " ever had a stomach ulcer or gastrointestinal bleeding, or are taking blood-thinning medicines, talk with your healthcare provider before using these medicines.) Aspirin should never be given to anyone under 18 years of age who is ill with a viral infection or fever. It may cause severe liver or brain damage.  · Your appetite may be poor, so a light diet is fine. Avoid dehydration by drinking 6 to 8 glasses of fluids per day (water, soft drinks, juices, tea, or soup). Extra fluids will help loosen secretions in the nose and lungs.  · Over-the-counter cold medicines will not shorten the length of time youre sick, but they may be helpful for the following symptoms: cough, sore throat, and nasal and sinus congestion. (Note: Do not use decongestants if you have high blood pressure.)  Follow-up care  Follow up with your healthcare provider, or as advised.  When to seek medical advice  Call your healthcare provider right away if any of these occur:  · Cough with lots of colored sputum (mucus)  · Severe headache; face, neck, or ear pain  · Difficulty swallowing due to throat pain  · Fever of 100.4°F (38°C)  Call 911, or get immediate medical care  Call emergency services right away if any of these occur:  · Chest pain, shortness of breath, wheezing, or difficulty breathing  · Coughing up blood  · Inability to swallow due to throat pain  Date Last Reviewed: 9/13/2015  © 0303-8926 [employer]. [address] [place] [address], [place] [address]. All rights reserved. This information is not intended as a substitute for professional medical care. Always follow your healthcare professional's instructions.

## 2019-01-31 NOTE — PATIENT INSTRUCTIONS
START TAKING A DECONGESTANT AS DISCUSSED AND USE FLONASE DAILY ESPECIALLY DURING THE WEATHER CHANGES. THE DECONGESTANT CAN BE FOUND AT YOUR NEAREST PHARMACY BEHIND THE COUNTER.     You must understand that you've received an Urgent Care treatment only and that you may be released before all your medical problems are known or treated. You, the patient, will arrange for follow up care as instructed.  If your condition worsens we recommend that you receive another evaluation at the emergency room immediately or contact your primary medical clinics after hours call service to discuss your concerns.  Please return here or go to the Emergency Department for any concerns or worsening of condition.      Viral Upper Respiratory Illness (Adult)  You have a viral upper respiratory illness (URI), which is another term for the common cold. This illness is contagious during the first few days. It is spread through the air by coughing and sneezing. It may also be spread by direct contact (touching the sick person and then touching your own eyes, nose, or mouth). Frequent handwashing will decrease risk of spread. Most viral illnesses go away within 7 to 10 days with rest and simple home remedies. Sometimes the illness may last for several weeks. Antibiotics will not kill a virus, and they are generally not prescribed for this condition.    Home care  · If symptoms are severe, rest at home for the first 2 to 3 days. When you resume activity, don't let yourself get too tired.  · Avoid being exposed to cigarette smoke (yours or others).  · You may use acetaminophen or ibuprofen to control pain and fever, unless another medicine was prescribed. (Note: If you have chronic liver or kidney disease, have ever had a stomach ulcer or gastrointestinal bleeding, or are taking blood-thinning medicines, talk with your healthcare provider before using these medicines.) Aspirin should never be given to anyone under 18 years of age who is ill with  a viral infection or fever. It may cause severe liver or brain damage.  · Your appetite may be poor, so a light diet is fine. Avoid dehydration by drinking 6 to 8 glasses of fluids per day (water, soft drinks, juices, tea, or soup). Extra fluids will help loosen secretions in the nose and lungs.  · Over-the-counter cold medicines will not shorten the length of time youre sick, but they may be helpful for the following symptoms: cough, sore throat, and nasal and sinus congestion. (Note: Do not use decongestants if you have high blood pressure.)  Follow-up care  Follow up with your healthcare provider, or as advised.  When to seek medical advice  Call your healthcare provider right away if any of these occur:  · Cough with lots of colored sputum (mucus)  · Severe headache; face, neck, or ear pain  · Difficulty swallowing due to throat pain  · Fever of 100.4°F (38°C)  Call 911, or get immediate medical care  Call emergency services right away if any of these occur:  · Chest pain, shortness of breath, wheezing, or difficulty breathing  · Coughing up blood  · Inability to swallow due to throat pain  Date Last Reviewed: 9/13/2015  © 4534-8834 [employer]. [address] [place] [address], [place] [address]. All rights reserved. This information is not intended as a substitute for professional medical care. Always follow your healthcare professional's instructions.

## 2019-04-11 ENCOUNTER — PATIENT MESSAGE (OUTPATIENT)
Dept: SURGERY | Facility: CLINIC | Age: 69
End: 2019-04-11

## 2019-04-11 ENCOUNTER — TELEPHONE (OUTPATIENT)
Dept: SURGERY | Facility: CLINIC | Age: 69
End: 2019-04-11

## 2019-04-11 DIAGNOSIS — E04.1 THYROID NODULE: Primary | ICD-10-CM

## 2019-04-11 NOTE — TELEPHONE ENCOUNTER
----- Message from Mariella Hernandez sent at 4/11/2019  2:55 PM CDT -----  Contact: Self  Pt called requesting a call back from Tati regarding a post appt and also a ultrasound that she needs to have schedule. Pt could be reached at [phone]      Mariella, assisting with General Surgery calls incase any mistakes

## 2019-04-15 ENCOUNTER — PATIENT MESSAGE (OUTPATIENT)
Dept: SURGERY | Facility: CLINIC | Age: 69
End: 2019-04-15

## 2019-04-18 ENCOUNTER — HOSPITAL ENCOUNTER (OUTPATIENT)
Dept: RADIOLOGY | Facility: HOSPITAL | Age: 69
Discharge: HOME | End: 2019-04-18
Attending: SURGERY
Payer: MEDICARE

## 2019-04-18 ENCOUNTER — OFFICE VISIT (OUTPATIENT)
Dept: SURGERY | Facility: CLINIC | Age: 69
End: 2019-04-18
Payer: MEDICARE

## 2019-04-18 VITALS
HEIGHT: 67 IN | BODY MASS INDEX: 22.84 KG/M2 | SYSTOLIC BLOOD PRESSURE: 163 MMHG | TEMPERATURE: 98 F | HEART RATE: 61 BPM | DIASTOLIC BLOOD PRESSURE: 76 MMHG | WEIGHT: 145.5 LBS

## 2019-04-18 DIAGNOSIS — M79.89 SOFT TISSUE MASS: Primary | ICD-10-CM

## 2019-04-18 DIAGNOSIS — E04.1 THYROID NODULE: ICD-10-CM

## 2019-04-18 PROCEDURE — 99213 OFFICE O/P EST LOW 20 MIN: CPT | Mod: S$GLB,,, | Performed by: SURGERY

## 2019-04-18 PROCEDURE — 99999 PR PBB SHADOW E&M-EST. PATIENT-LVL III: CPT | Mod: PBBFAC,,, | Performed by: SURGERY

## 2019-04-18 PROCEDURE — 76536 US SOFT TISSUE HEAD NECK THYROID: ICD-10-PCS | Mod: 26,,, | Performed by: RADIOLOGY

## 2019-04-18 PROCEDURE — 99213 PR OFFICE/OUTPT VISIT, EST, LEVL III, 20-29 MIN: ICD-10-PCS | Mod: S$GLB,,, | Performed by: SURGERY

## 2019-04-18 PROCEDURE — 99999 PR PBB SHADOW E&M-EST. PATIENT-LVL III: ICD-10-PCS | Mod: PBBFAC,,, | Performed by: SURGERY

## 2019-04-18 PROCEDURE — 3288F PR FALLS RISK ASSESSMENT DOCUMENTED: ICD-10-PCS | Mod: CPTII,S$GLB,, | Performed by: SURGERY

## 2019-04-18 PROCEDURE — 3288F FALL RISK ASSESSMENT DOCD: CPT | Mod: CPTII,S$GLB,, | Performed by: SURGERY

## 2019-04-18 PROCEDURE — 76536 US EXAM OF HEAD AND NECK: CPT | Mod: TC

## 2019-04-18 PROCEDURE — 1100F PR PT FALLS ASSESS DOC 2+ FALLS/FALL W/INJURY/YR: ICD-10-PCS | Mod: CPTII,S$GLB,, | Performed by: SURGERY

## 2019-04-18 PROCEDURE — 76536 US EXAM OF HEAD AND NECK: CPT | Mod: 26,,, | Performed by: RADIOLOGY

## 2019-04-18 PROCEDURE — 1100F PTFALLS ASSESS-DOCD GE2>/YR: CPT | Mod: CPTII,S$GLB,, | Performed by: SURGERY

## 2019-04-18 RX ORDER — TEMAZEPAM 30 MG/1
CAPSULE ORAL NIGHTLY PRN
COMMUNITY
Start: 2012-09-26

## 2019-04-18 RX ORDER — PRAVASTATIN SODIUM 40 MG/1
TABLET ORAL
Refills: 3 | COMMUNITY
Start: 2019-03-12
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
## 2019-06-17 ENCOUNTER — TELEPHONE (OUTPATIENT)
Dept: SURGERY | Facility: CLINIC | Age: 69
End: 2019-06-17

## 2019-06-17 ENCOUNTER — PATIENT MESSAGE (OUTPATIENT)
Dept: SURGERY | Facility: CLINIC | Age: 69
End: 2019-06-17

## 2019-06-17 NOTE — TELEPHONE ENCOUNTER
Spoke with pt. She is c/o left breast pain with a palpable lump near her incision site from a breast procedure 2 years ago. She is requesting to be seen and examined. Pt will come in tomorrow for 3:00pm as requested.

## 2019-06-17 NOTE — TELEPHONE ENCOUNTER
----- Message from Ethan Golden sent at 6/17/2019  8:13 AM CDT -----  Contact: pt  Needs Advice    Reason for call: pt states she has been experiencing serious breast pain. The pt feels she needs to be seen today and have a possible exam today.     Communication Preference: [phone]    Additional Information:

## 2019-06-18 ENCOUNTER — OFFICE VISIT (OUTPATIENT)
Dept: SURGERY | Facility: CLINIC | Age: 69
End: 2019-06-18
Payer: MEDICARE

## 2019-06-18 VITALS
SYSTOLIC BLOOD PRESSURE: 182 MMHG | BODY MASS INDEX: 23.07 KG/M2 | HEART RATE: 71 BPM | TEMPERATURE: 99 F | WEIGHT: 147 LBS | DIASTOLIC BLOOD PRESSURE: 79 MMHG | HEIGHT: 67 IN

## 2019-06-18 DIAGNOSIS — R07.89 COSTOCHONDRAL PAIN: Primary | ICD-10-CM

## 2019-06-18 PROCEDURE — 99999 PR PBB SHADOW E&M-EST. PATIENT-LVL III: ICD-10-PCS | Mod: PBBFAC,,, | Performed by: SURGERY

## 2019-06-18 PROCEDURE — 99211 OFF/OP EST MAY X REQ PHY/QHP: CPT | Mod: S$GLB,,, | Performed by: SURGERY

## 2019-06-18 PROCEDURE — 99211 PR OFFICE/OUTPT VISIT, EST, LEVL I: ICD-10-PCS | Mod: S$GLB,,, | Performed by: SURGERY

## 2019-06-18 PROCEDURE — 99999 PR PBB SHADOW E&M-EST. PATIENT-LVL III: CPT | Mod: PBBFAC,,, | Performed by: SURGERY

## 2019-06-18 NOTE — PROGRESS NOTES
Patient has a history of a very medial left breast DCIS  Treated by BCT  Started working out recently  Had acute pain that was severe but has impoved some over the past day  On exam there is no evidence for recurrence  Her left pierre[praclivicular fatty mass is larger on clinical exam  She is due for a mammogram in August  I will see her back at that time and re-consider left neck excision

## 2019-08-19 ENCOUNTER — HOSPITAL ENCOUNTER (OUTPATIENT)
Dept: RADIOLOGY | Facility: HOSPITAL | Age: 69
Discharge: HOME | End: 2019-08-19
Attending: OBSTETRICS & GYNECOLOGY
Payer: MEDICARE

## 2019-08-19 DIAGNOSIS — N60.02 CYST, BREAST, LEFT: ICD-10-CM

## 2019-08-19 DIAGNOSIS — N60.19 FIBROCYSTIC BREAST CHANGES: ICD-10-CM

## 2019-08-19 DIAGNOSIS — R92.30 BREAST DENSITY: ICD-10-CM

## 2019-08-19 DIAGNOSIS — R92.1 BREAST CALCIFICATION SEEN ON MAMMOGRAM: ICD-10-CM

## 2019-08-19 PROCEDURE — 77066 MAMMO DIGITAL DIAGNOSTIC BILAT WITH TOMOSYNTHESIS_CAD: ICD-10-PCS | Mod: 26,,, | Performed by: RADIOLOGY

## 2019-08-19 PROCEDURE — 77066 DX MAMMO INCL CAD BI: CPT | Mod: TC,PO

## 2019-08-19 PROCEDURE — 76642 US BREAST LEFT LIMITED: ICD-10-PCS | Mod: 26,LT,, | Performed by: RADIOLOGY

## 2019-08-19 PROCEDURE — 77062 BREAST TOMOSYNTHESIS BI: CPT | Mod: 26,,, | Performed by: RADIOLOGY

## 2019-08-19 PROCEDURE — 76642 ULTRASOUND BREAST LIMITED: CPT | Mod: TC,PO,LT

## 2019-08-19 PROCEDURE — 77066 DX MAMMO INCL CAD BI: CPT | Mod: 26,,, | Performed by: RADIOLOGY

## 2019-08-19 PROCEDURE — 77062 MAMMO DIGITAL DIAGNOSTIC BILAT WITH TOMOSYNTHESIS_CAD: ICD-10-PCS | Mod: 26,,, | Performed by: RADIOLOGY

## 2019-08-19 PROCEDURE — 76642 ULTRASOUND BREAST LIMITED: CPT | Mod: 26,LT,, | Performed by: RADIOLOGY

## 2019-08-23 ENCOUNTER — PATIENT MESSAGE (OUTPATIENT)
Dept: SURGERY | Facility: CLINIC | Age: 69
End: 2019-08-23

## 2019-08-29 ENCOUNTER — OFFICE VISIT (OUTPATIENT)
Dept: SURGERY | Facility: CLINIC | Age: 69
End: 2019-08-29
Payer: MEDICARE

## 2019-08-29 VITALS
WEIGHT: 148.13 LBS | BODY MASS INDEX: 23.25 KG/M2 | DIASTOLIC BLOOD PRESSURE: 74 MMHG | SYSTOLIC BLOOD PRESSURE: 155 MMHG | TEMPERATURE: 98 F | HEIGHT: 67 IN | HEART RATE: 70 BPM

## 2019-08-29 DIAGNOSIS — N63.20 BREAST MASS, LEFT: Primary | ICD-10-CM

## 2019-08-29 PROCEDURE — 1101F PT FALLS ASSESS-DOCD LE1/YR: CPT | Mod: CPTII,S$GLB,, | Performed by: SURGERY

## 2019-08-29 PROCEDURE — 99213 PR OFFICE/OUTPT VISIT, EST, LEVL III, 20-29 MIN: ICD-10-PCS | Mod: S$GLB,,, | Performed by: SURGERY

## 2019-08-29 PROCEDURE — 99999 PR PBB SHADOW E&M-EST. PATIENT-LVL III: CPT | Mod: PBBFAC,,, | Performed by: SURGERY

## 2019-08-29 PROCEDURE — 99999 PR PBB SHADOW E&M-EST. PATIENT-LVL III: ICD-10-PCS | Mod: PBBFAC,,, | Performed by: SURGERY

## 2019-08-29 PROCEDURE — 99213 OFFICE O/P EST LOW 20 MIN: CPT | Mod: S$GLB,,, | Performed by: SURGERY

## 2019-08-29 PROCEDURE — 1101F PR PT FALLS ASSESS DOC 0-1 FALLS W/OUT INJ PAST YR: ICD-10-PCS | Mod: CPTII,S$GLB,, | Performed by: SURGERY

## 2019-08-29 NOTE — PROGRESS NOTES
HPI: Tamra Benjamin is a 68 y.o. female with Hx of DCIS in L breast, core biopsy left breast 7- with DCIS, left lumpectomy 7-, adjuvant XRT.  Presents in clinic complaining of L breast cyst and L breast pain.  States that the cyst has been present for a few months and she feels like it's getting bigger.  Denies skin or nipple changes, nipple discharge, new lumps or bony pain.       Mammogram and U/S 8/19/2019:  Impression:  Left  Cyst: Left breast 35 mm x 21 mm x 9 mm cyst at the 8 o'clock position. Assessment: 3 - Probably benign. Short Interval Follow-Up in 6 Months is recommended.      Right  There is no mammographic or sonographic evidence of malignancy.     BI-RADS Category:   Overall: 3 - Probably Benign          Past Medical History:   Diagnosis Date    Allergy      Arthritis      Breast cancer 2017     DCIS    Cancer 07/2016     DCIS left breast    Colon polyps      Erosive esophagitis      Fever blister      Hyperlipidemia      Hypothyroidism      Joint pain      Multiple thyroid nodules                 Family History   Problem Relation Age of Onset    Heart disease Mother      Cancer Mother 67    Breast cancer Mother 69    Heart disease Father      Diabetes Father      Kidney disease Father      Heart disease Sister      Ovarian cancer Neg Hx           Review of Systems   Constitutional: Negative for chills, fever, malaise/fatigue and weight loss.   HENT: Negative.    Eyes: Negative.    Respiratory: Negative.    Cardiovascular: Negative.    Gastrointestinal: Negative.    Genitourinary: Negative.    Musculoskeletal: Negative for back pain.   Skin: Negative.    Neurological: Negative.    Endo/Heme/Allergies: Negative.    Psychiatric/Behavioral: Negative.          Physical Exam   Constitutional: She is oriented to person, place, and time and well-developed, well-nourished, and in no distress.   HENT:   Head: Normocephalic.   Eyes: Pupils are equal, round, and reactive to  light.   Neck: Normal range of motion.   Pulmonary/Chest: Effort normal. Left breast exhibits mass and tenderness. Left breast exhibits no inverted nipple, no nipple discharge and no skin change.       Musculoskeletal: Normal range of motion.   Neurological: She is alert and oriented to person, place, and time.   Skin: Skin is warm and dry.         ASSESSMENT/PLAN: Tamra Benjamin is a 68 y.o. female with L breast cyst.  Pain likely due to radiation, draining cyst will probably not relieve her symptoms.  L breast mammogram in 6 mo.  F/U with our clinic prn.       I have personally performed a detailed history and physical examination on this patient. My findings are summarized in the resident's note included in the record.  There is a soft fluid collection in the medial most left breast  This finding is no more or less tender than her remaining breast tissue  No therapeutic value in performing an aspiration  Continue to follow radiographically

## 2019-08-29 NOTE — MEDICAL/APP STUDENT
HPI: Tamra Benjamin is a 68 y.o. female with Hx of DCIS in L breast, core biopsy left breast 7- with DCIS, left lumpectomy 7-, adjuvant XRT.  Presents in clinic complaining of L breast cyst and L breast pain.  States that the cyst has been present for a few months and she feels like it's getting bigger.  Denies skin or nipple changes, nipple discharge, new lumps or bony pain.      Mammogram and U/S 8/19/2019:  Impression:  Left  Cyst: Left breast 35 mm x 21 mm x 9 mm cyst at the 8 o'clock position. Assessment: 3 - Probably benign. Short Interval Follow-Up in 6 Months is recommended.      Right  There is no mammographic or sonographic evidence of malignancy.     BI-RADS Category:   Overall: 3 - Probably Benign    Past Medical History:   Diagnosis Date    Allergy     Arthritis     Breast cancer 2017    DCIS    Cancer 07/2016    DCIS left breast    Colon polyps     Erosive esophagitis     Fever blister     Hyperlipidemia     Hypothyroidism     Joint pain     Multiple thyroid nodules         Family History   Problem Relation Age of Onset    Heart disease Mother     Cancer Mother 67    Breast cancer Mother 69    Heart disease Father     Diabetes Father     Kidney disease Father     Heart disease Sister     Ovarian cancer Neg Hx         Review of Systems   Constitutional: Negative for chills, fever, malaise/fatigue and weight loss.   HENT: Negative.    Eyes: Negative.    Respiratory: Negative.    Cardiovascular: Negative.    Gastrointestinal: Negative.    Genitourinary: Negative.    Musculoskeletal: Negative for back pain.   Skin: Negative.    Neurological: Negative.    Endo/Heme/Allergies: Negative.    Psychiatric/Behavioral: Negative.        Physical Exam   Constitutional: She is oriented to person, place, and time and well-developed, well-nourished, and in no distress.   HENT:   Head: Normocephalic.   Eyes: Pupils are equal, round, and reactive to light.   Neck: Normal range of  motion.   Pulmonary/Chest: Effort normal. Left breast exhibits mass and tenderness. Left breast exhibits no inverted nipple, no nipple discharge and no skin change.       Musculoskeletal: Normal range of motion.   Neurological: She is alert and oriented to person, place, and time.   Skin: Skin is warm and dry.       ASSESSMENT/PLAN: Tamra Benjamin is a 68 y.o. female with L breast cyst.  Pain likely due to radiation, draining cyst will probably not relieve her symptoms.  L breast mammogram in 6 mo.  F/U with our clinic prn.

## 2019-09-10 ENCOUNTER — PES CALL (OUTPATIENT)
Dept: ADMINISTRATIVE | Facility: CLINIC | Age: 69
End: 2019-09-10

## 2019-12-27 ENCOUNTER — OFFICE VISIT (OUTPATIENT)
Dept: URGENT CARE | Facility: CLINIC | Age: 69
End: 2019-12-27
Payer: MEDICARE

## 2019-12-27 VITALS
RESPIRATION RATE: 16 BRPM | BODY MASS INDEX: 22.44 KG/M2 | SYSTOLIC BLOOD PRESSURE: 135 MMHG | HEART RATE: 86 BPM | TEMPERATURE: 98 F | HEIGHT: 67 IN | OXYGEN SATURATION: 98 % | DIASTOLIC BLOOD PRESSURE: 79 MMHG | WEIGHT: 143 LBS

## 2019-12-27 DIAGNOSIS — J06.9 VIRAL URI: Primary | ICD-10-CM

## 2019-12-27 DIAGNOSIS — R05.9 COUGH: ICD-10-CM

## 2019-12-27 LAB
CTP QC/QA: YES
FLUAV AG NPH QL: NEGATIVE
FLUBV AG NPH QL: NEGATIVE

## 2019-12-27 PROCEDURE — 96372 THER/PROPH/DIAG INJ SC/IM: CPT | Mod: S$GLB,,, | Performed by: NURSE PRACTITIONER

## 2019-12-27 PROCEDURE — 99214 PR OFFICE/OUTPT VISIT, EST, LEVL IV, 30-39 MIN: ICD-10-PCS | Mod: 25,S$GLB,, | Performed by: NURSE PRACTITIONER

## 2019-12-27 PROCEDURE — 99214 OFFICE O/P EST MOD 30 MIN: CPT | Mod: 25,S$GLB,, | Performed by: NURSE PRACTITIONER

## 2019-12-27 PROCEDURE — 87804 INFLUENZA ASSAY W/OPTIC: CPT | Mod: QW,S$GLB,, | Performed by: NURSE PRACTITIONER

## 2019-12-27 PROCEDURE — 96372 PR INJECTION,THERAP/PROPH/DIAG2ST, IM OR SUBCUT: ICD-10-PCS | Mod: S$GLB,,, | Performed by: NURSE PRACTITIONER

## 2019-12-27 PROCEDURE — 87804 POCT INFLUENZA A/B: ICD-10-PCS | Mod: 59,QW,S$GLB, | Performed by: NURSE PRACTITIONER

## 2019-12-27 RX ORDER — FLUTICASONE PROPIONATE 50 MCG
1 SPRAY, SUSPENSION (ML) NASAL 2 TIMES DAILY
Qty: 1 BOTTLE | Refills: 0 | Status: SHIPPED | OUTPATIENT
Start: 2019-12-27 | End: 2020-09-09

## 2019-12-27 RX ORDER — BETAMETHASONE SODIUM PHOSPHATE AND BETAMETHASONE ACETATE 3; 3 MG/ML; MG/ML
6 INJECTION, SUSPENSION INTRA-ARTICULAR; INTRALESIONAL; INTRAMUSCULAR; SOFT TISSUE
Status: COMPLETED | OUTPATIENT
Start: 2019-12-27 | End: 2019-12-27

## 2019-12-27 RX ADMIN — BETAMETHASONE SODIUM PHOSPHATE AND BETAMETHASONE ACETATE 6 MG: 3; 3 INJECTION, SUSPENSION INTRA-ARTICULAR; INTRALESIONAL; INTRAMUSCULAR; SOFT TISSUE at 09:12

## 2019-12-28 NOTE — PATIENT INSTRUCTIONS
PLEASE READ YOUR DISCHARGE INSTRUCTIONS ENTIRELY AS IT CONTAINS IMPORTANT INFORMATION.    You received a steroid today - this can elevate your blood pressure, elevate your blood sugar, water weight gain, nervous energy, redness to the face and dimpling of the skin where the shot goes in if you had an injection.   Do not use steroids more than 3 times per year.   If you have diabetes, please check you blood sugar frequently.  If you have high blood pressure, please check your blood pressure frequently.     Please drink plenty of fluids.    Please get plenty of rest.    Please return here or go to the Emergency Department for any concerns or worsening of condition.    Please take an over the counter antihistamine medication (allegra/Claritin/Zyrtec) of your choice as directed.    Try an over the counter decongestant like Mucinex D or Sudafed. You buy this behind the pharmacy counter    If you do have Hypertension or palpitations, it is safe to take Coricidin HBP for relief of sinus symptoms.    If not allergic, please take over the counter Tylenol (Acetaminophen) and/or Motrin (Ibuprofen) as directed for control of pain and/or fever.  Please follow up with your primary care doctor or specialist as needed.    Sore throat recommendations: Warm fluids, warm salt water gargles, throat lozenges, tea, honey, soup, rest, hydration.    Use over the counter flonase: one spray each nostril twice daily OR two sprays each nostril once daily.     If you  smoke, please stop smoking.      Please return or see your primary care doctor if you develop new or worsening symptoms.     Please arrange follow up with your primary medical clinic as soon as possible. You must understand that you've received an Urgent Care treatment only and that you may be released before all of your medical problems are known or treated. You, the patient, will arrange for follow up as instructed. If your symptoms worsen or fail to improve you should go to  the Emergency Room.      Viral Upper Respiratory Illness (Adult)  You have a viral upper respiratory illness (URI), which is another term for the common cold. This illness is contagious during the first few days. It is spread through the air by coughing and sneezing. It may also be spread by direct contact (touching the sick person and then touching your own eyes, nose, or mouth). Frequent handwashing will decrease risk of spread. Most viral illnesses go away within 7 to 10 days with rest and simple home remedies. Sometimes the illness may last for several weeks. Antibiotics will not kill a virus, and they are generally not prescribed for this condition.    Home care  · If symptoms are severe, rest at home for the first 2 to 3 days. When you resume activity, don't let yourself get too tired.  · Avoid being exposed to cigarette smoke (yours or others).  · You may use acetaminophen or ibuprofen to control pain and fever, unless another medicine was prescribed. (Note: If you have chronic liver or kidney disease, have ever had a stomach ulcer or gastrointestinal bleeding, or are taking blood-thinning medicines, talk with your healthcare provider before using these medicines.) Aspirin should never be given to anyone under 18 years of age who is ill with a viral infection or fever. It may cause severe liver or brain damage.  · Your appetite may be poor, so a light diet is fine. Avoid dehydration by drinking 6 to 8 glasses of fluids per day (water, soft drinks, juices, tea, or soup). Extra fluids will help loosen secretions in the nose and lungs.  · Over-the-counter cold medicines will not shorten the length of time youre sick, but they may be helpful for the following symptoms: cough, sore throat, and nasal and sinus congestion. (Note: Do not use decongestants if you have high blood pressure.)  Follow-up care  Follow up with your healthcare provider, or as advised.  When to seek medical advice  Call your healthcare  provider right away if any of these occur:  · Cough with lots of colored sputum (mucus)  · Severe headache; face, neck, or ear pain  · Difficulty swallowing due to throat pain  · Fever of 100.4°F (38°C)  Call 911, or get immediate medical care  Call emergency services right away if any of these occur:  · Chest pain, shortness of breath, wheezing, or difficulty breathing  · Coughing up blood  · Inability to swallow due to throat pain  Date Last Reviewed: 9/13/2015  © 1392-2460 [email or web address]. [address] [place] [id number]. All rights reserved. This information is not intended as a substitute for professional medical care. Always follow your healthcare professional's instructions.

## 2019-12-28 NOTE — PROGRESS NOTES
"Subjective:       Patient ID: Tamra Benjamin is a 69 y.o. female.    Vitals:  height is 5' 7" (1.702 m) and weight is 64.9 kg (143 lb). Her temperature is 97.5 °F (36.4 °C). Her blood pressure is 135/79 and her pulse is 86. Her respiration is 16 and oxygen saturation is 98%.     Chief Complaint: Sinus Problem    Sinus Problem   This is a new problem. The current episode started yesterday. The problem has been gradually worsening since onset. There has been no fever. Her pain is at a severity of 4/10. The pain is mild. Associated symptoms include chills, congestion, a hoarse voice and sinus pressure. Pertinent negatives include no coughing, diaphoresis, ear pain, shortness of breath or sore throat. Treatments tried: otc cold  med. The treatment provided mild relief.       Constitution: Positive for chills. Negative for sweating, fatigue and fever.   HENT: Positive for congestion, sinus pain, sinus pressure and voice change. Negative for ear pain and sore throat.    Neck: Negative for painful lymph nodes.   Eyes: Negative for eye redness.   Respiratory: Negative for chest tightness, cough, sputum production, bloody sputum, COPD, shortness of breath, stridor, wheezing and asthma.    Gastrointestinal: Negative for nausea and vomiting.   Musculoskeletal: Negative for muscle ache.   Skin: Negative for rash.   Allergic/Immunologic: Negative for seasonal allergies and asthma.   Hematologic/Lymphatic: Negative for swollen lymph nodes.       Objective:      Physical Exam   Constitutional: She is oriented to person, place, and time. She appears well-developed and well-nourished. She is cooperative.  Non-toxic appearance. She does not have a sickly appearance. She does not appear ill. No distress.   Pt calm and cooperative. Hoarse voice. NAD noted.   HENT:   Head: Normocephalic and atraumatic.   Right Ear: Hearing, tympanic membrane, external ear and ear canal normal.   Left Ear: Hearing, tympanic membrane, external ear " and ear canal normal.   Nose: Mucosal edema present. No rhinorrhea or nasal deformity. No epistaxis. Right sinus exhibits no maxillary sinus tenderness and no frontal sinus tenderness. Left sinus exhibits no maxillary sinus tenderness and no frontal sinus tenderness.   Mouth/Throat: Uvula is midline, oropharynx is clear and moist and mucous membranes are normal. No trismus in the jaw. Normal dentition. No uvula swelling. Cobblestoning present. No oropharyngeal exudate, posterior oropharyngeal edema or posterior oropharyngeal erythema. Tonsils are 0 on the right. Tonsils are 0 on the left. No tonsillar exudate.   Tonsils surgically absent.   Eyes: Conjunctivae and lids are normal. No scleral icterus.   Neck: Trachea normal, full passive range of motion without pain and phonation normal. Neck supple. No neck rigidity. No edema and no erythema present.   Cardiovascular: Normal rate, regular rhythm, normal heart sounds, intact distal pulses and normal pulses.   Pulmonary/Chest: Effort normal and breath sounds normal. No stridor. No respiratory distress. She has no decreased breath sounds. She has no wheezes. She has no rhonchi. She has no rales. She exhibits no tenderness.   Abdominal: Normal appearance.   Musculoskeletal: Normal range of motion. She exhibits no edema or deformity.   Neurological: She is alert and oriented to person, place, and time. She exhibits normal muscle tone. Coordination normal.   Skin: Skin is warm, dry, intact, not diaphoretic and not pale.   Psychiatric: She has a normal mood and affect. Her speech is normal and behavior is normal. Judgment and thought content normal. Cognition and memory are normal.   Nursing note and vitals reviewed.        Results for orders placed or performed in visit on 12/27/19   POCT Influenza A/B   Result Value Ref Range    Rapid Influenza A Ag Negative Negative    Rapid Influenza B Ag Negative Negative     Acceptable Yes      Assessment:       1.  Viral URI    2. Cough        Plan:         Viral URI  -     betamethasone acetate-betamethasone sodium phosphate injection 6 mg  -     fluticasone propionate (FLONASE) 50 mcg/actuation nasal spray; 1 spray (50 mcg total) by Each Nostril route 2 (two) times daily.  Dispense: 1 Bottle; Refill: 0    Cough  -     POCT Influenza A/B    Risks vs benefits of steroid use discussed with pt. Pt understands and would like to proceed with injection.       Patient Instructions       PLEASE READ YOUR DISCHARGE INSTRUCTIONS ENTIRELY AS IT CONTAINS IMPORTANT INFORMATION.    You received a steroid today - this can elevate your blood pressure, elevate your blood sugar, water weight gain, nervous energy, redness to the face and dimpling of the skin where the shot goes in if you had an injection.   Do not use steroids more than 3 times per year.   If you have diabetes, please check you blood sugar frequently.  If you have high blood pressure, please check your blood pressure frequently.     Please drink plenty of fluids.    Please get plenty of rest.    Please return here or go to the Emergency Department for any concerns or worsening of condition.    Please take an over the counter antihistamine medication (allegra/Claritin/Zyrtec) of your choice as directed.    Try an over the counter decongestant like Mucinex D or Sudafed. You buy this behind the pharmacy counter    If you do have Hypertension or palpitations, it is safe to take Coricidin HBP for relief of sinus symptoms.    If not allergic, please take over the counter Tylenol (Acetaminophen) and/or Motrin (Ibuprofen) as directed for control of pain and/or fever.  Please follow up with your primary care doctor or specialist as needed.    Sore throat recommendations: Warm fluids, warm salt water gargles, throat lozenges, tea, honey, soup, rest, hydration.    Use over the counter flonase: one spray each nostril twice daily OR two sprays each nostril once daily.     If you  smoke,  please stop smoking.      Please return or see your primary care doctor if you develop new or worsening symptoms.     Please arrange follow up with your primary medical clinic as soon as possible. You must understand that you've received an Urgent Care treatment only and that you may be released before all of your medical problems are known or treated. You, the patient, will arrange for follow up as instructed. If your symptoms worsen or fail to improve you should go to the Emergency Room.      Viral Upper Respiratory Illness (Adult)  You have a viral upper respiratory illness (URI), which is another term for the common cold. This illness is contagious during the first few days. It is spread through the air by coughing and sneezing. It may also be spread by direct contact (touching the sick person and then touching your own eyes, nose, or mouth). Frequent handwashing will decrease risk of spread. Most viral illnesses go away within 7 to 10 days with rest and simple home remedies. Sometimes the illness may last for several weeks. Antibiotics will not kill a virus, and they are generally not prescribed for this condition.    Home care  · If symptoms are severe, rest at home for the first 2 to 3 days. When you resume activity, don't let yourself get too tired.  · Avoid being exposed to cigarette smoke (yours or others).  · You may use acetaminophen or ibuprofen to control pain and fever, unless another medicine was prescribed. (Note: If you have chronic liver or kidney disease, have ever had a stomach ulcer or gastrointestinal bleeding, or are taking blood-thinning medicines, talk with your healthcare provider before using these medicines.) Aspirin should never be given to anyone under 18 years of age who is ill with a viral infection or fever. It may cause severe liver or brain damage.  · Your appetite may be poor, so a light diet is fine. Avoid dehydration by drinking 6 to 8 glasses of fluids per day (water, soft  drinks, juices, tea, or soup). Extra fluids will help loosen secretions in the nose and lungs.  · Over-the-counter cold medicines will not shorten the length of time youre sick, but they may be helpful for the following symptoms: cough, sore throat, and nasal and sinus congestion. (Note: Do not use decongestants if you have high blood pressure.)  Follow-up care  Follow up with your healthcare provider, or as advised.  When to seek medical advice  Call your healthcare provider right away if any of these occur:  · Cough with lots of colored sputum (mucus)  · Severe headache; face, neck, or ear pain  · Difficulty swallowing due to throat pain  · Fever of 100.4°F (38°C)  Call 911, or get immediate medical care  Call emergency services right away if any of these occur:  · Chest pain, shortness of breath, wheezing, or difficulty breathing  · Coughing up blood  · Inability to swallow due to throat pain  Date Last Reviewed: 9/13/2015  © 1415-8386 [employer]. [address] [place] [address], [place] [address]. All rights reserved. This information is not intended as a substitute for professional medical care. Always follow your healthcare professional's instructions.

## 2020-03-02 ENCOUNTER — HOSPITAL ENCOUNTER (OUTPATIENT)
Dept: RADIOLOGY | Facility: HOSPITAL | Age: 70
Discharge: HOME | End: 2020-03-02
Attending: OBSTETRICS & GYNECOLOGY
Payer: MEDICARE

## 2020-03-02 DIAGNOSIS — N60.19 FIBROCYSTIC DISEASE OF BREAST: ICD-10-CM

## 2020-03-02 PROCEDURE — 76642 ULTRASOUND BREAST LIMITED: CPT | Mod: 26,LT,, | Performed by: RADIOLOGY

## 2020-03-02 PROCEDURE — 76642 ULTRASOUND BREAST LIMITED: CPT | Mod: TC,PO,LT

## 2020-03-02 PROCEDURE — 76642 US BREAST LEFT LIMITED: ICD-10-PCS | Mod: 26,LT,, | Performed by: RADIOLOGY

## 2020-03-09 ENCOUNTER — OFFICE VISIT (OUTPATIENT)
Dept: SURGERY | Facility: CLINIC | Age: 70
End: 2020-03-09
Payer: MEDICARE

## 2020-03-09 VITALS
HEIGHT: 67 IN | DIASTOLIC BLOOD PRESSURE: 72 MMHG | HEART RATE: 67 BPM | SYSTOLIC BLOOD PRESSURE: 142 MMHG | BODY MASS INDEX: 23.02 KG/M2 | WEIGHT: 146.69 LBS

## 2020-03-09 DIAGNOSIS — Z85.3 PERSONAL HISTORY OF BREAST CANCER: Primary | ICD-10-CM

## 2020-03-09 DIAGNOSIS — Z85.3 HISTORY OF BREAST CANCER: ICD-10-CM

## 2020-03-09 DIAGNOSIS — Z12.31 ENCOUNTER FOR SCREENING MAMMOGRAM FOR MALIGNANT NEOPLASM OF BREAST: ICD-10-CM

## 2020-03-09 DIAGNOSIS — Z12.39 BREAST CANCER SCREENING: Primary | ICD-10-CM

## 2020-03-09 PROCEDURE — 1159F PR MEDICATION LIST DOCUMENTED IN MEDICAL RECORD: ICD-10-PCS | Mod: S$GLB,,, | Performed by: PHYSICIAN ASSISTANT

## 2020-03-09 PROCEDURE — 99999 PR PBB SHADOW E&M-EST. PATIENT-LVL III: ICD-10-PCS | Mod: PBBFAC,,, | Performed by: PHYSICIAN ASSISTANT

## 2020-03-09 PROCEDURE — 1101F PT FALLS ASSESS-DOCD LE1/YR: CPT | Mod: CPTII,S$GLB,, | Performed by: PHYSICIAN ASSISTANT

## 2020-03-09 PROCEDURE — 1126F PR PAIN SEVERITY QUANTIFIED, NO PAIN PRESENT: ICD-10-PCS | Mod: S$GLB,,, | Performed by: PHYSICIAN ASSISTANT

## 2020-03-09 PROCEDURE — 99999 PR PBB SHADOW E&M-EST. PATIENT-LVL III: CPT | Mod: PBBFAC,,, | Performed by: PHYSICIAN ASSISTANT

## 2020-03-09 PROCEDURE — 1159F MED LIST DOCD IN RCRD: CPT | Mod: S$GLB,,, | Performed by: PHYSICIAN ASSISTANT

## 2020-03-09 PROCEDURE — 1101F PR PT FALLS ASSESS DOC 0-1 FALLS W/OUT INJ PAST YR: ICD-10-PCS | Mod: CPTII,S$GLB,, | Performed by: PHYSICIAN ASSISTANT

## 2020-03-09 PROCEDURE — 99213 PR OFFICE/OUTPT VISIT, EST, LEVL III, 20-29 MIN: ICD-10-PCS | Mod: S$GLB,,, | Performed by: PHYSICIAN ASSISTANT

## 2020-03-09 PROCEDURE — 99213 OFFICE O/P EST LOW 20 MIN: CPT | Mod: S$GLB,,, | Performed by: PHYSICIAN ASSISTANT

## 2020-03-09 PROCEDURE — 1126F AMNT PAIN NOTED NONE PRSNT: CPT | Mod: S$GLB,,, | Performed by: PHYSICIAN ASSISTANT

## 2020-03-09 NOTE — PROGRESS NOTES
[facility]  [facility]  3/9/2020      SUBJECTIVE:   Ms. Tamra Benjamin is a 69 y.o. female with a history of LEFT breast cancer who presents today for follow up breast cancer screening.      History of Present Illness: Ms. Tamra Benjamin is a 69 y.o. 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 She did not take endocrine therapy. Presents in clinic for 6 month follow up. She was last seen by Dr. Robins on 8/29/2019 due to left breast cyst and left breast pain. She had imaging with mammogram and ultrasound on 8/19/19 which showed left breast cyst  measuring 35 mm x 21 mm x 9 mm cyst at the 8 o'clock position, this was read as BI-RADS 3 and short interval follow up was recommended. She had a follow up ultrasound on 3/2/2020 which showed decreased size of a left breast seroma at 8 o'clock, 8 cm from the nipple measuring 23 x 6 x 11 mm, previously 35 x 9 x 21 mm. She does report that she continues to feel the lesion in her left breast, however, this continues decrease in size.     Interval History:  Patient states she is doing well. She denies any unexplained weight loss, changes in vision, or recent headaches.  She denies any bone pain.  She denies palpating any new masses in her breasts.        Review of Systems: Denies any chest pain or shortness of breath.  Denies any fever or chills.  See HPI/ Interval History for other systems reviewed.    OBJECTIVE:   Vitals:    03/09/20 0912   BP: (!) 142/72   Pulse: 67        Physical Exam:  HEENT: Normocephalic, atraumatic.    General: alert and oriented; no acute distress.  Breast/Chest: No right breast masses.  No erythema or edema bilaterally.  2 x 3 cm palpable mobile mildly tender density at 10 o clock position of the left breast, 4 cm from the NAC, (consistent with resolving seroma based on recent imaging and exam).  Lymph: No palpable adjacent axillary  lymph nodes.  No cervical or supraclavicular lymphadenopathy.    Extremity Exam: The patient has 5/5 sensation and 5/5 motor strength of the bilateral upper and lower extremities.    Neurologic Exam: Cranial nerves II-XII are grossly intact.  There are no focal neurologic deficits.    Mammogram and U/S 8/19/2019:  Impression:  Left  Cyst: Left breast 35 mm x 21 mm x 9 mm cyst at the 8 o'clock position. Assessment: 3 - Probably benign. Short Interval Follow-Up in 6 Months is recommended.   Right  There is no mammographic or sonographic evidence of malignancy.  BI-RADS Category:   Overall: 3 - Probably Benign    3/2/2020  Result:   US Breast Left Limited  History:  Patient is 69 y.o. and is seen for a diagnostic ultrasound, short term follow up.  Films Compared:  Prior images (if available) were compared.  Findings:  Decreased size of a left breast seroma at 8 o'clock, 8 cm from the nipple measuring 23 x 6 x 11 mm, previously 35 x 9 x 21 mm. Patient denies any pain at site of seroma.   There is no evidence of suspicious masses in the left breast at region examined.  Impression:  There is no sonographic evidence of malignancy in the left breast.  BI-RADS Category:   Left: 2 - Benign  Overall: 2 - Benign     Recommendation:  Return to annual screening mammogram schedule is recommended      These findings and recommendations were discussed with the patient at the time of the exam by Dr. Sherine Rojas.     ASSESSMENT:  Ms. Tamra Benjamin is a 69 y.o. year old female with a history of left breast cancer who presents today for short interval follow up for left breast pain and left breast cyst.    PLAN:   We discussed that per most recent imaging and based on CBE from today, she may return to routine annual screening mammograms. Order placed today for annual screening mammogram for August 2020. Discussed with patient to continue routine self-breast exams and to notify clinic of any changes or concerns with her breasts.  Plan to follow up with me in 1 year. Patient voiced understanding and agrees with this plan.    ~Shannon Yost PA-C      Surgical Oncology            3/9/2020

## 2020-08-06 DIAGNOSIS — R00.2 PALPITATIONS: Primary | ICD-10-CM

## 2020-08-10 ENCOUNTER — OFFICE VISIT (OUTPATIENT)
Dept: SURGERY | Facility: CLINIC | Age: 70
End: 2020-08-10
Payer: MEDICARE

## 2020-08-10 ENCOUNTER — TELEPHONE (OUTPATIENT)
Dept: SURGERY | Facility: CLINIC | Age: 70
End: 2020-08-10

## 2020-08-10 VITALS
HEART RATE: 78 BPM | BODY MASS INDEX: 23.07 KG/M2 | HEIGHT: 67 IN | SYSTOLIC BLOOD PRESSURE: 145 MMHG | DIASTOLIC BLOOD PRESSURE: 65 MMHG | WEIGHT: 147 LBS

## 2020-08-10 DIAGNOSIS — L98.9 SKIN LESION OF LEFT LEG: ICD-10-CM

## 2020-08-10 DIAGNOSIS — N63.20 LEFT BREAST MASS: Primary | ICD-10-CM

## 2020-08-10 DIAGNOSIS — R92.8 ABNORMAL FINDING ON BREAST IMAGING: ICD-10-CM

## 2020-08-10 PROCEDURE — 1125F AMNT PAIN NOTED PAIN PRSNT: CPT | Mod: S$GLB,,, | Performed by: PHYSICIAN ASSISTANT

## 2020-08-10 PROCEDURE — 99999 PR PBB SHADOW E&M-EST. PATIENT-LVL IV: CPT | Mod: PBBFAC,,, | Performed by: PHYSICIAN ASSISTANT

## 2020-08-10 PROCEDURE — 3008F BODY MASS INDEX DOCD: CPT | Mod: CPTII,S$GLB,, | Performed by: PHYSICIAN ASSISTANT

## 2020-08-10 PROCEDURE — 1101F PT FALLS ASSESS-DOCD LE1/YR: CPT | Mod: CPTII,S$GLB,, | Performed by: PHYSICIAN ASSISTANT

## 2020-08-10 PROCEDURE — 3008F PR BODY MASS INDEX (BMI) DOCUMENTED: ICD-10-PCS | Mod: CPTII,S$GLB,, | Performed by: PHYSICIAN ASSISTANT

## 2020-08-10 PROCEDURE — 99213 PR OFFICE/OUTPT VISIT, EST, LEVL III, 20-29 MIN: ICD-10-PCS | Mod: S$GLB,,, | Performed by: PHYSICIAN ASSISTANT

## 2020-08-10 PROCEDURE — 1159F MED LIST DOCD IN RCRD: CPT | Mod: S$GLB,,, | Performed by: PHYSICIAN ASSISTANT

## 2020-08-10 PROCEDURE — 1159F PR MEDICATION LIST DOCUMENTED IN MEDICAL RECORD: ICD-10-PCS | Mod: S$GLB,,, | Performed by: PHYSICIAN ASSISTANT

## 2020-08-10 PROCEDURE — 1125F PR PAIN SEVERITY QUANTIFIED, PAIN PRESENT: ICD-10-PCS | Mod: S$GLB,,, | Performed by: PHYSICIAN ASSISTANT

## 2020-08-10 PROCEDURE — 99999 PR PBB SHADOW E&M-EST. PATIENT-LVL IV: ICD-10-PCS | Mod: PBBFAC,,, | Performed by: PHYSICIAN ASSISTANT

## 2020-08-10 PROCEDURE — 1101F PR PT FALLS ASSESS DOC 0-1 FALLS W/OUT INJ PAST YR: ICD-10-PCS | Mod: CPTII,S$GLB,, | Performed by: PHYSICIAN ASSISTANT

## 2020-08-10 PROCEDURE — 99213 OFFICE O/P EST LOW 20 MIN: CPT | Mod: S$GLB,,, | Performed by: PHYSICIAN ASSISTANT

## 2020-08-10 RX ORDER — MINOXIDIL 2.5 MG/1
2.5 TABLET ORAL DAILY
COMMUNITY

## 2020-08-10 RX ORDER — OLMESARTAN MEDOXOMIL 20 MG/1
20 TABLET ORAL DAILY
COMMUNITY
End: 2020-09-09

## 2020-08-10 NOTE — TELEPHONE ENCOUNTER
Contacted patient to schedule consult visit with Dr. Mcconnell. Patient scheduled on 8/20 at 2:45 PM at [facility], 2nd floor, [facility]. Location, date, and time reviewed. Patient verbalized understanding.    ----- Message from Margarita Moore sent at 8/10/2020 10:28 AM CDT -----  Please see referral

## 2020-08-10 NOTE — PROGRESS NOTES
[facility]  [facility]  8/10/2020      SUBJECTIVE:   Ms. Tamra Benjamin is a 69 y.o. female with a history of LEFT breast cancer who presents today for follow up breast cancer screening.      History of Present Illness: Patient has a Hx of DCIS in L breast, core biopsy left breast 7- with DCIS, left lumpectomy 7-, adjuvant XRT. She did not take endocrine therapy. Presents in clinic for 6 month follow up. She was last seen by Dr. Robins on 8/29/2019 due to left breast cyst and left breast pain. She had imaging with mammogram and ultrasound on 8/19/19 which showed left breast cyst  measuring 35 mm x 21 mm x 9 mm cyst at the 8 o'clock position, this was read as BI-RADS 3 and short interval follow up was recommended. She had a follow up ultrasound on 3/2/2020 which showed decreased size of a left breast seroma at 8 o'clock, 8 cm from the nipple measuring 23 x 6 x 11 mm, previously 35 x 9 x 21 mm. She does report that she continues to feel the lesion in her left breast, however, this continues decrease in size.      Interval History:  Patient states she is doing well. She denies palpating any masses at her breasts.    She states she recently had a biopsy of skin lesion at her lower left leg.  The doctor told her it was suspicious for a skin cancer, but patient states she was never given the pathology report.     On 8/19/19 she had a bilateral diagnostic mammogram and u/s which showed a 3.5 cm complicated cyst at the 8 o'clock position of the left breast, BIRADS 3 (probably benign).      Review of Systems: Denies any chest pain or shortness of breath.  Denies any fever or chills.  See HPI/ Interval History for other systems reviewed.    OBJECTIVE:   Vitals:    08/10/20 0922   BP: (!) 145/65   Pulse: 78      Physical Exam:  HEENT: Normocephalic, atraumatic.    General: alert and oriented; no acute distress.  Breast/Chest: No masses present bilaterally.  No erythema or edema.   No nipple discharge or inversion. No tenderness bilaterally.   Lymph: No palpable adjacent axillary lymph nodes.      ASSESSMENT:  Ms. Tamra Benjamin is a 69 y.o. year old female with a history of LEFT breast cancer who presents today for follow up breast cancer screening.      PLAN:   We discussed that there was nothing concerning on clinical breast exam today.  She will need a bilateral diagnostic mammogram and u/s at the end of this month.  She can follow up with me in one year.    ~Shannon Yost PA-C      Surgical Oncology            8/10/2020

## 2020-08-24 ENCOUNTER — HOSPITAL ENCOUNTER (OUTPATIENT)
Dept: RADIOLOGY | Facility: HOSPITAL | Age: 70
Discharge: HOME | End: 2020-08-24
Attending: PHYSICIAN ASSISTANT
Payer: MEDICARE

## 2020-08-24 DIAGNOSIS — R92.8 ABNORMAL FINDING ON BREAST IMAGING: ICD-10-CM

## 2020-08-24 DIAGNOSIS — N63.20 LEFT BREAST MASS: ICD-10-CM

## 2020-08-24 PROCEDURE — 76642 ULTRASOUND BREAST LIMITED: CPT | Mod: TC,LT

## 2020-08-24 PROCEDURE — 77062 BREAST TOMOSYNTHESIS BI: CPT | Mod: TC

## 2020-08-24 PROCEDURE — 76642 ULTRASOUND BREAST LIMITED: CPT | Mod: 26,LT,, | Performed by: RADIOLOGY

## 2020-08-24 PROCEDURE — 77066 MAMMO DIGITAL DIAGNOSTIC BILAT WITH TOMOSYNTHESIS_CAD: ICD-10-PCS | Mod: 26,,, | Performed by: RADIOLOGY

## 2020-08-24 PROCEDURE — 77062 MAMMO DIGITAL DIAGNOSTIC BILAT WITH TOMOSYNTHESIS_CAD: ICD-10-PCS | Mod: 26,,, | Performed by: RADIOLOGY

## 2020-08-24 PROCEDURE — 77066 DX MAMMO INCL CAD BI: CPT | Mod: 26,,, | Performed by: RADIOLOGY

## 2020-08-24 PROCEDURE — 77062 BREAST TOMOSYNTHESIS BI: CPT | Mod: 26,,, | Performed by: RADIOLOGY

## 2020-08-24 PROCEDURE — 76642 US BREAST LEFT LIMITED: ICD-10-PCS | Mod: 26,LT,, | Performed by: RADIOLOGY

## 2020-09-07 PROBLEM — I10 HTN (HYPERTENSION), BENIGN: Status: ACTIVE | Noted: 2020-09-07

## 2020-09-08 NOTE — PROGRESS NOTES
Subjective:   Patient ID:  Tamra Benjamin is a 70 y.o. female who presents for follow-up of HTN    HPI:  Patient is here for evaluation and treatment of hypertension but BP now 110s.  The patient has no chest pain, SOB, TIA, palpitations, syncope or pre-syncope.Patient does exercise.        Review of Systems   Constitution: Negative for chills, decreased appetite, diaphoresis, fever, malaise/fatigue, night sweats, weight gain and weight loss.   HENT: Negative for congestion, hoarse voice, nosebleeds, sore throat and tinnitus.    Eyes: Negative for blurred vision, double vision, vision loss in left eye, vision loss in right eye, visual disturbance and visual halos.   Cardiovascular: Negative for chest pain, claudication, cyanosis, dyspnea on exertion, irregular heartbeat, leg swelling, near-syncope, orthopnea, palpitations, paroxysmal nocturnal dyspnea and syncope.   Respiratory: Negative for cough, hemoptysis, shortness of breath, sleep disturbances due to breathing, snoring, sputum production and wheezing.    Endocrine: Negative for cold intolerance, heat intolerance, polydipsia, polyphagia and polyuria.   Hematologic/Lymphatic: Negative for adenopathy and bleeding problem. Does not bruise/bleed easily.   Skin: Negative for color change, dry skin, flushing, itching, nail changes, poor wound healing, rash, skin cancer, suspicious lesions and unusual hair distribution.   Musculoskeletal: Negative for arthritis, back pain, falls, gout, joint pain, joint swelling, muscle cramps, muscle weakness, myalgias and stiffness.   Gastrointestinal: Negative for abdominal pain, anorexia, change in bowel habit, constipation, diarrhea, dysphagia, heartburn, hematemesis, hematochezia, melena and vomiting.   Genitourinary: Negative for decreased libido, dysuria, hematuria, hesitancy and urgency.   Neurological: Negative for excessive daytime sleepiness, dizziness, focal weakness, headaches, light-headedness, loss of balance,  "numbness, paresthesias, seizures, sensory change, tremors, vertigo and weakness.   Psychiatric/Behavioral: Negative for altered mental status, depression, hallucinations, memory loss, substance abuse and suicidal ideas. The patient does not have insomnia and is not nervous/anxious.    Allergic/Immunologic: Negative for environmental allergies and hives.       Objective: BP (!) 130/58 (BP Location: Left arm, Patient Position: Sitting, BP Method: Large (Automatic))   Pulse 68   Ht 5' 7" (1.702 m)   Wt 66.9 kg (147 lb 7.8 oz)   BMI 23.10 kg/m²      Physical Exam   Constitutional: She is oriented to person, place, and time. She appears well-developed and well-nourished.   HENT:   Head: Normocephalic.   Eyes: Pupils are equal, round, and reactive to light. EOM are normal.   Neck: Normal range of motion. Normal carotid pulses, no hepatojugular reflux and no JVD present. Carotid bruit is not present. No thyromegaly present.   Cardiovascular: Normal rate, regular rhythm, normal heart sounds and intact distal pulses. Exam reveals no gallop and no friction rub.   No murmur heard.  Pulmonary/Chest: Effort normal and breath sounds normal. No tachypnea. No respiratory distress. She has no wheezes. She has no rales. She exhibits no tenderness.   Abdominal: Soft. Bowel sounds are normal. She exhibits no distension and no mass. There is no abdominal tenderness. There is no rebound and no guarding.   Musculoskeletal: Normal range of motion.         General: No tenderness or edema.   Lymphadenopathy:     She has no cervical adenopathy.   Neurological: She is alert and oriented to person, place, and time. No cranial nerve deficit. Coordination normal.   Skin: Skin is warm. No rash noted. No erythema.   Psychiatric: She has a normal mood and affect. Her behavior is normal. Judgment and thought content normal.       Assessment:     1. HTN (hypertension), benign    2. Hypothyroidism due to acquired atrophy of thyroid    3. History of " breast cancer    4. Venous insufficiency of both lower extremities    5. Arthritis    6. Easy bruising    7. Coagulopathy    8. Encounter for screening for cardiovascular disorders    9. Family history of early CAD    10. Dyslipidemia        Plan:   Discussed diet , achieving and maintaining ideal body weight, and exercise.   We reviewed meds in detail.   Reassured-Discussed goals , options , plan  Could get CAC and treat lipids if a lot of plaque as well as ASA and stress if very high  Omega-3 > 800/d EPA/DHA unless no plaque.    Tamra was seen today for establish care and hypertension.    Diagnoses and all orders for this visit:    HTN (hypertension), benign  -     Basic metabolic panel; Future; Expected date: 09/10/2020  -     CT Cardiac Scoring; Future; Expected date: 09/09/2020    Hypothyroidism due to acquired atrophy of thyroid    History of breast cancer    Venous insufficiency of both lower extremities    Arthritis    Easy bruising  -     CBC auto differential; Future; Expected date: 09/10/2020  -     Protime-INR; Future; Expected date: 09/10/2020  -     APTT; Future; Expected date: 09/10/2020    Coagulopathy  -     CBC auto differential; Future; Expected date: 09/10/2020  -     Protime-INR; Future; Expected date: 09/10/2020  -     APTT; Future; Expected date: 09/10/2020    Encounter for screening for cardiovascular disorders  -     CT Cardiac Scoring; Future; Expected date: 09/09/2020    Family history of early CAD  -     CT Cardiac Scoring; Future; Expected date: 09/09/2020    Dyslipidemia  -     CT Cardiac Scoring; Future; Expected date: 09/09/2020    Other orders  -     hydroCHLOROthiazide (HYDRODIURIL) 12.5 MG Tab; TK 1 T PO D IN THE MORNING            Follow up for Labs now; CAC now; get me recent lipids and thyroid from primary.

## 2020-09-09 ENCOUNTER — OFFICE VISIT (OUTPATIENT)
Dept: CARDIOLOGY | Facility: CLINIC | Age: 70
End: 2020-09-09
Payer: MEDICARE

## 2020-09-09 ENCOUNTER — HOSPITAL ENCOUNTER (OUTPATIENT)
Dept: CARDIOLOGY | Facility: CLINIC | Age: 70
Discharge: HOME | End: 2020-09-09
Payer: MEDICARE

## 2020-09-09 VITALS
WEIGHT: 147.5 LBS | DIASTOLIC BLOOD PRESSURE: 58 MMHG | SYSTOLIC BLOOD PRESSURE: 130 MMHG | BODY MASS INDEX: 23.15 KG/M2 | HEART RATE: 68 BPM | HEIGHT: 67 IN

## 2020-09-09 DIAGNOSIS — E78.5 DYSLIPIDEMIA: ICD-10-CM

## 2020-09-09 DIAGNOSIS — I87.2 VENOUS INSUFFICIENCY OF BOTH LOWER EXTREMITIES: ICD-10-CM

## 2020-09-09 DIAGNOSIS — Z13.6 ENCOUNTER FOR SCREENING FOR CARDIOVASCULAR DISORDERS: ICD-10-CM

## 2020-09-09 DIAGNOSIS — D68.9 COAGULOPATHY: ICD-10-CM

## 2020-09-09 DIAGNOSIS — R00.2 PALPITATIONS: ICD-10-CM

## 2020-09-09 DIAGNOSIS — Z82.49 FAMILY HISTORY OF EARLY CAD: ICD-10-CM

## 2020-09-09 DIAGNOSIS — E03.4 HYPOTHYROIDISM DUE TO ACQUIRED ATROPHY OF THYROID: ICD-10-CM

## 2020-09-09 DIAGNOSIS — Z85.3 HISTORY OF BREAST CANCER: ICD-10-CM

## 2020-09-09 DIAGNOSIS — I10 HTN (HYPERTENSION), BENIGN: Primary | ICD-10-CM

## 2020-09-09 DIAGNOSIS — M19.90 ARTHRITIS: ICD-10-CM

## 2020-09-09 DIAGNOSIS — R23.3 EASY BRUISING: ICD-10-CM

## 2020-09-09 PROCEDURE — 1101F PR PT FALLS ASSESS DOC 0-1 FALLS W/OUT INJ PAST YR: ICD-10-PCS | Mod: CPTII,S$GLB,, | Performed by: INTERNAL MEDICINE

## 2020-09-09 PROCEDURE — 3008F PR BODY MASS INDEX (BMI) DOCUMENTED: ICD-10-PCS | Mod: CPTII,S$GLB,, | Performed by: INTERNAL MEDICINE

## 2020-09-09 PROCEDURE — 1126F PR PAIN SEVERITY QUANTIFIED, NO PAIN PRESENT: ICD-10-PCS | Mod: S$GLB,,, | Performed by: INTERNAL MEDICINE

## 2020-09-09 PROCEDURE — 99999 PR PBB SHADOW E&M-EST. PATIENT-LVL IV: ICD-10-PCS | Mod: PBBFAC,,, | Performed by: INTERNAL MEDICINE

## 2020-09-09 PROCEDURE — 1159F PR MEDICATION LIST DOCUMENTED IN MEDICAL RECORD: ICD-10-PCS | Mod: S$GLB,,, | Performed by: INTERNAL MEDICINE

## 2020-09-09 PROCEDURE — 93010 ELECTROCARDIOGRAM REPORT: CPT | Mod: S$GLB,,, | Performed by: INTERNAL MEDICINE

## 2020-09-09 PROCEDURE — 93005 ELECTROCARDIOGRAM TRACING: CPT | Mod: S$GLB,,, | Performed by: INTERNAL MEDICINE

## 2020-09-09 PROCEDURE — 1159F MED LIST DOCD IN RCRD: CPT | Mod: S$GLB,,, | Performed by: INTERNAL MEDICINE

## 2020-09-09 PROCEDURE — 99999 PR PBB SHADOW E&M-EST. PATIENT-LVL IV: CPT | Mod: PBBFAC,,, | Performed by: INTERNAL MEDICINE

## 2020-09-09 PROCEDURE — 99204 OFFICE O/P NEW MOD 45 MIN: CPT | Mod: S$GLB,,, | Performed by: INTERNAL MEDICINE

## 2020-09-09 PROCEDURE — 99204 PR OFFICE/OUTPT VISIT, NEW, LEVL IV, 45-59 MIN: ICD-10-PCS | Mod: S$GLB,,, | Performed by: INTERNAL MEDICINE

## 2020-09-09 PROCEDURE — 1126F AMNT PAIN NOTED NONE PRSNT: CPT | Mod: S$GLB,,, | Performed by: INTERNAL MEDICINE

## 2020-09-09 PROCEDURE — 3008F BODY MASS INDEX DOCD: CPT | Mod: CPTII,S$GLB,, | Performed by: INTERNAL MEDICINE

## 2020-09-09 PROCEDURE — 93010 EKG 12-LEAD: ICD-10-PCS | Mod: S$GLB,,, | Performed by: INTERNAL MEDICINE

## 2020-09-09 PROCEDURE — 1101F PT FALLS ASSESS-DOCD LE1/YR: CPT | Mod: CPTII,S$GLB,, | Performed by: INTERNAL MEDICINE

## 2020-09-09 PROCEDURE — 93005 EKG 12-LEAD: ICD-10-PCS | Mod: S$GLB,,, | Performed by: INTERNAL MEDICINE

## 2020-09-09 RX ORDER — HYDROCHLOROTHIAZIDE 12.5 MG/1
TABLET ORAL
COMMUNITY
Start: 2020-07-27

## 2020-09-09 NOTE — PATIENT INSTRUCTIONS
Discussed diet , achieving and maintaining ideal body weight, and exercise.   We reviewed meds in detail.   Reassured-Discussed goals , options , plan  Could get CAC and treat lipids more vigorously if a lot of plaque as well as ASA and stress if very high  Omega-3 > 800/d EPA/DHA unless no plaque.  I want to see recent lipids and thyroids

## 2020-09-22 ENCOUNTER — HOSPITAL ENCOUNTER (OUTPATIENT)
Dept: RADIOLOGY | Facility: HOSPITAL | Age: 70
Discharge: HOME | End: 2020-09-22
Attending: INTERNAL MEDICINE
Payer: MEDICARE

## 2020-09-22 DIAGNOSIS — I10 HTN (HYPERTENSION), BENIGN: ICD-10-CM

## 2020-09-22 DIAGNOSIS — Z82.49 FAMILY HISTORY OF EARLY CAD: ICD-10-CM

## 2020-09-22 DIAGNOSIS — E78.5 DYSLIPIDEMIA: ICD-10-CM

## 2020-09-22 DIAGNOSIS — Z13.6 ENCOUNTER FOR SCREENING FOR CARDIOVASCULAR DISORDERS: ICD-10-CM

## 2020-09-22 PROBLEM — R91.1 LUNG NODULE: Status: ACTIVE | Noted: 2020-09-22

## 2020-09-22 PROCEDURE — 75571 CT HRT W/O DYE W/CA TEST: CPT | Mod: TC

## 2020-09-22 PROCEDURE — 75571 CT CALCIUM SCORING CARDIAC: ICD-10-PCS | Mod: 26,,, | Performed by: RADIOLOGY

## 2020-09-22 PROCEDURE — 75571 CT HRT W/O DYE W/CA TEST: CPT | Mod: 26,,, | Performed by: RADIOLOGY

## 2020-09-25 ENCOUNTER — PATIENT MESSAGE (OUTPATIENT)
Dept: CARDIOLOGY | Facility: CLINIC | Age: 70
End: 2020-09-25

## 2020-09-26 NOTE — PROGRESS NOTES
Results [facility] recent [facility]

## 2021-01-10 ENCOUNTER — IMMUNIZATION (OUTPATIENT)
Dept: FAMILY MEDICINE | Facility: CLINIC | Age: 71
End: 2021-01-10
Payer: MEDICARE

## 2021-01-10 DIAGNOSIS — Z23 NEED FOR VACCINATION: ICD-10-CM

## 2021-01-10 PROCEDURE — 91300 COVID-19, MRNA, LNP-S, PF, 30 MCG/0.3 ML DOSE VACCINE: CPT | Mod: PBBFAC | Performed by: FAMILY MEDICINE

## 2021-01-12 ENCOUNTER — OFFICE VISIT (OUTPATIENT)
Dept: OBSTETRICS AND GYNECOLOGY | Facility: CLINIC | Age: 71
End: 2021-01-12
Attending: OBSTETRICS & GYNECOLOGY
Payer: MEDICARE

## 2021-01-12 VITALS
SYSTOLIC BLOOD PRESSURE: 118 MMHG | BODY MASS INDEX: 22.49 KG/M2 | WEIGHT: 143.31 LBS | DIASTOLIC BLOOD PRESSURE: 78 MMHG | HEIGHT: 67 IN

## 2021-01-12 DIAGNOSIS — Z12.4 SCREENING FOR MALIGNANT NEOPLASM OF THE CERVIX: ICD-10-CM

## 2021-01-12 DIAGNOSIS — Z11.51 SCREENING FOR HUMAN PAPILLOMAVIRUS: ICD-10-CM

## 2021-01-12 DIAGNOSIS — Z01.419 ENCOUNTER FOR GYNECOLOGICAL EXAMINATION: Primary | ICD-10-CM

## 2021-01-12 PROCEDURE — G0101 CA SCREEN;PELVIC/BREAST EXAM: HCPCS | Mod: S$GLB,,, | Performed by: OBSTETRICS & GYNECOLOGY

## 2021-01-12 PROCEDURE — 1101F PR PT FALLS ASSESS DOC 0-1 FALLS W/OUT INJ PAST YR: ICD-10-PCS | Mod: CPTII,S$GLB,, | Performed by: OBSTETRICS & GYNECOLOGY

## 2021-01-12 PROCEDURE — 3078F PR MOST RECENT DIASTOLIC BLOOD PRESSURE < 80 MM HG: ICD-10-PCS | Mod: CPTII,S$GLB,, | Performed by: OBSTETRICS & GYNECOLOGY

## 2021-01-12 PROCEDURE — 3078F DIAST BP <80 MM HG: CPT | Mod: CPTII,S$GLB,, | Performed by: OBSTETRICS & GYNECOLOGY

## 2021-01-12 PROCEDURE — 99999 PR PBB SHADOW E&M-EST. PATIENT-LVL III: CPT | Mod: PBBFAC,,, | Performed by: OBSTETRICS & GYNECOLOGY

## 2021-01-12 PROCEDURE — 3074F SYST BP LT 130 MM HG: CPT | Mod: CPTII,S$GLB,, | Performed by: OBSTETRICS & GYNECOLOGY

## 2021-01-12 PROCEDURE — 3288F FALL RISK ASSESSMENT DOCD: CPT | Mod: CPTII,S$GLB,, | Performed by: OBSTETRICS & GYNECOLOGY

## 2021-01-12 PROCEDURE — 3074F PR MOST RECENT SYSTOLIC BLOOD PRESSURE < 130 MM HG: ICD-10-PCS | Mod: CPTII,S$GLB,, | Performed by: OBSTETRICS & GYNECOLOGY

## 2021-01-12 PROCEDURE — 3288F PR FALLS RISK ASSESSMENT DOCUMENTED: ICD-10-PCS | Mod: CPTII,S$GLB,, | Performed by: OBSTETRICS & GYNECOLOGY

## 2021-01-12 PROCEDURE — 1101F PT FALLS ASSESS-DOCD LE1/YR: CPT | Mod: CPTII,S$GLB,, | Performed by: OBSTETRICS & GYNECOLOGY

## 2021-01-12 PROCEDURE — 99999 PR PBB SHADOW E&M-EST. PATIENT-LVL III: ICD-10-PCS | Mod: PBBFAC,,, | Performed by: OBSTETRICS & GYNECOLOGY

## 2021-01-12 PROCEDURE — 1126F AMNT PAIN NOTED NONE PRSNT: CPT | Mod: S$GLB,,, | Performed by: OBSTETRICS & GYNECOLOGY

## 2021-01-12 PROCEDURE — 3008F PR BODY MASS INDEX (BMI) DOCUMENTED: ICD-10-PCS | Mod: CPTII,S$GLB,, | Performed by: OBSTETRICS & GYNECOLOGY

## 2021-01-12 PROCEDURE — G0101 PR CA SCREEN;PELVIC/BREAST EXAM: ICD-10-PCS | Mod: S$GLB,,, | Performed by: OBSTETRICS & GYNECOLOGY

## 2021-01-12 PROCEDURE — 88175 CYTOPATH C/V AUTO FLUID REDO: CPT

## 2021-01-12 PROCEDURE — 3008F BODY MASS INDEX DOCD: CPT | Mod: CPTII,S$GLB,, | Performed by: OBSTETRICS & GYNECOLOGY

## 2021-01-12 PROCEDURE — 87624 HPV HI-RISK TYP POOLED RSLT: CPT

## 2021-01-12 PROCEDURE — 1126F PR PAIN SEVERITY QUANTIFIED, NO PAIN PRESENT: ICD-10-PCS | Mod: S$GLB,,, | Performed by: OBSTETRICS & GYNECOLOGY

## 2021-01-22 LAB
HPV HR 12 DNA SPEC QL NAA+PROBE: NEGATIVE
HPV16 AG SPEC QL: NEGATIVE
HPV18 DNA SPEC QL NAA+PROBE: NEGATIVE

## 2021-01-31 ENCOUNTER — IMMUNIZATION (OUTPATIENT)
Dept: FAMILY MEDICINE | Facility: CLINIC | Age: 71
End: 2021-01-31
Payer: MEDICARE

## 2021-01-31 DIAGNOSIS — Z23 NEED FOR VACCINATION: Primary | ICD-10-CM

## 2021-01-31 PROCEDURE — 0002A COVID-19, MRNA, LNP-S, PF, 30 MCG/0.3 ML DOSE VACCINE: CPT | Mod: PBBFAC | Performed by: FAMILY MEDICINE

## 2021-01-31 PROCEDURE — 91300 COVID-19, MRNA, LNP-S, PF, 30 MCG/0.3 ML DOSE VACCINE: CPT | Mod: PBBFAC | Performed by: FAMILY MEDICINE

## 2021-02-08 LAB
FINAL PATHOLOGIC DIAGNOSIS: NORMAL
Lab: NORMAL

## 2021-02-23 ENCOUNTER — PATIENT MESSAGE (OUTPATIENT)
Dept: RHEUMATOLOGY | Facility: CLINIC | Age: 71
End: 2021-02-23

## 2021-03-18 ENCOUNTER — PATIENT MESSAGE (OUTPATIENT)
Dept: RESEARCH | Facility: HOSPITAL | Age: 71
End: 2021-03-18

## 2021-03-26 ENCOUNTER — PATIENT MESSAGE (OUTPATIENT)
Dept: RESEARCH | Facility: HOSPITAL | Age: 71
End: 2021-03-26

## 2021-07-01 ENCOUNTER — PATIENT MESSAGE (OUTPATIENT)
Dept: SURGERY | Facility: CLINIC | Age: 71
End: 2021-07-01

## 2021-07-10 ENCOUNTER — TELEPHONE (OUTPATIENT)
Dept: SPORTS MEDICINE | Facility: CLINIC | Age: 71
End: 2021-07-10

## 2021-07-31 ENCOUNTER — PATIENT MESSAGE (OUTPATIENT)
Dept: SURGERY | Facility: CLINIC | Age: 71
End: 2021-07-31

## 2021-08-02 DIAGNOSIS — Z12.31 ENCOUNTER FOR SCREENING MAMMOGRAM FOR MALIGNANT NEOPLASM OF BREAST: Primary | ICD-10-CM

## 2021-09-13 ENCOUNTER — OFFICE VISIT (OUTPATIENT)
Dept: SURGERY | Facility: CLINIC | Age: 71
End: 2021-09-13
Payer: MEDICARE

## 2021-09-13 ENCOUNTER — HOSPITAL ENCOUNTER (OUTPATIENT)
Dept: RADIOLOGY | Facility: HOSPITAL | Age: 71
Discharge: HOME | End: 2021-09-13
Attending: NURSE PRACTITIONER
Payer: MEDICARE

## 2021-09-13 VITALS
BODY MASS INDEX: 22.44 KG/M2 | WEIGHT: 143 LBS | HEIGHT: 67 IN | SYSTOLIC BLOOD PRESSURE: 115 MMHG | DIASTOLIC BLOOD PRESSURE: 56 MMHG | HEART RATE: 75 BPM

## 2021-09-13 VITALS — BODY MASS INDEX: 22.4 KG/M2 | WEIGHT: 143 LBS

## 2021-09-13 DIAGNOSIS — Z12.31 ENCOUNTER FOR SCREENING MAMMOGRAM FOR MALIGNANT NEOPLASM OF BREAST: ICD-10-CM

## 2021-09-13 DIAGNOSIS — Z85.3 HISTORY OF BREAST CANCER: Primary | ICD-10-CM

## 2021-09-13 DIAGNOSIS — Z12.39 ENCOUNTER FOR SCREENING BREAST EXAMINATION: ICD-10-CM

## 2021-09-13 PROCEDURE — 1126F PR PAIN SEVERITY QUANTIFIED, NO PAIN PRESENT: ICD-10-PCS | Mod: CPTII,S$GLB,, | Performed by: NURSE PRACTITIONER

## 2021-09-13 PROCEDURE — 77067 MAMMO DIGITAL SCREENING BILAT WITH TOMO: ICD-10-PCS | Mod: 26,,, | Performed by: RADIOLOGY

## 2021-09-13 PROCEDURE — 3078F PR MOST RECENT DIASTOLIC BLOOD PRESSURE < 80 MM HG: ICD-10-PCS | Mod: CPTII,S$GLB,, | Performed by: NURSE PRACTITIONER

## 2021-09-13 PROCEDURE — 77067 SCR MAMMO BI INCL CAD: CPT | Mod: TC

## 2021-09-13 PROCEDURE — 99213 OFFICE O/P EST LOW 20 MIN: CPT | Mod: S$GLB,,, | Performed by: NURSE PRACTITIONER

## 2021-09-13 PROCEDURE — 99999 PR PBB SHADOW E&M-EST. PATIENT-LVL III: ICD-10-PCS | Mod: PBBFAC,,, | Performed by: NURSE PRACTITIONER

## 2021-09-13 PROCEDURE — 4010F PR ACE/ARB THEARPY RXD/TAKEN: ICD-10-PCS | Mod: CPTII,S$GLB,, | Performed by: NURSE PRACTITIONER

## 2021-09-13 PROCEDURE — 1159F MED LIST DOCD IN RCRD: CPT | Mod: CPTII,S$GLB,, | Performed by: NURSE PRACTITIONER

## 2021-09-13 PROCEDURE — 1159F PR MEDICATION LIST DOCUMENTED IN MEDICAL RECORD: ICD-10-PCS | Mod: CPTII,S$GLB,, | Performed by: NURSE PRACTITIONER

## 2021-09-13 PROCEDURE — 77067 SCR MAMMO BI INCL CAD: CPT | Mod: 26,,, | Performed by: RADIOLOGY

## 2021-09-13 PROCEDURE — 99999 PR PBB SHADOW E&M-EST. PATIENT-LVL III: CPT | Mod: PBBFAC,,, | Performed by: NURSE PRACTITIONER

## 2021-09-13 PROCEDURE — 4010F ACE/ARB THERAPY RXD/TAKEN: CPT | Mod: CPTII,S$GLB,, | Performed by: NURSE PRACTITIONER

## 2021-09-13 PROCEDURE — 77063 MAMMO DIGITAL SCREENING BILAT WITH TOMO: ICD-10-PCS | Mod: 26,,, | Performed by: RADIOLOGY

## 2021-09-13 PROCEDURE — 1101F PR PT FALLS ASSESS DOC 0-1 FALLS W/OUT INJ PAST YR: ICD-10-PCS | Mod: CPTII,S$GLB,, | Performed by: NURSE PRACTITIONER

## 2021-09-13 PROCEDURE — 3008F BODY MASS INDEX DOCD: CPT | Mod: CPTII,S$GLB,, | Performed by: NURSE PRACTITIONER

## 2021-09-13 PROCEDURE — 1160F PR REVIEW ALL MEDS BY PRESCRIBER/CLIN PHARMACIST DOCUMENTED: ICD-10-PCS | Mod: CPTII,S$GLB,, | Performed by: NURSE PRACTITIONER

## 2021-09-13 PROCEDURE — 3074F PR MOST RECENT SYSTOLIC BLOOD PRESSURE < 130 MM HG: ICD-10-PCS | Mod: CPTII,S$GLB,, | Performed by: NURSE PRACTITIONER

## 2021-09-13 PROCEDURE — 3288F FALL RISK ASSESSMENT DOCD: CPT | Mod: CPTII,S$GLB,, | Performed by: NURSE PRACTITIONER

## 2021-09-13 PROCEDURE — 77063 BREAST TOMOSYNTHESIS BI: CPT | Mod: 26,,, | Performed by: RADIOLOGY

## 2021-09-13 PROCEDURE — 1126F AMNT PAIN NOTED NONE PRSNT: CPT | Mod: CPTII,S$GLB,, | Performed by: NURSE PRACTITIONER

## 2021-09-13 PROCEDURE — 1160F RVW MEDS BY RX/DR IN RCRD: CPT | Mod: CPTII,S$GLB,, | Performed by: NURSE PRACTITIONER

## 2021-09-13 PROCEDURE — 3008F PR BODY MASS INDEX (BMI) DOCUMENTED: ICD-10-PCS | Mod: CPTII,S$GLB,, | Performed by: NURSE PRACTITIONER

## 2021-09-13 PROCEDURE — 3078F DIAST BP <80 MM HG: CPT | Mod: CPTII,S$GLB,, | Performed by: NURSE PRACTITIONER

## 2021-09-13 PROCEDURE — 1101F PT FALLS ASSESS-DOCD LE1/YR: CPT | Mod: CPTII,S$GLB,, | Performed by: NURSE PRACTITIONER

## 2021-09-13 PROCEDURE — 3074F SYST BP LT 130 MM HG: CPT | Mod: CPTII,S$GLB,, | Performed by: NURSE PRACTITIONER

## 2021-09-13 PROCEDURE — 99213 PR OFFICE/OUTPT VISIT, EST, LEVL III, 20-29 MIN: ICD-10-PCS | Mod: S$GLB,,, | Performed by: NURSE PRACTITIONER

## 2021-09-13 PROCEDURE — 3288F PR FALLS RISK ASSESSMENT DOCUMENTED: ICD-10-PCS | Mod: CPTII,S$GLB,, | Performed by: NURSE PRACTITIONER

## 2022-02-01 ENCOUNTER — HOSPITAL ENCOUNTER (OUTPATIENT)
Dept: CARDIOLOGY | Facility: CLINIC | Age: 72
Discharge: HOME | End: 2022-02-01
Payer: MEDICARE

## 2022-02-01 DIAGNOSIS — I10 HTN (HYPERTENSION), BENIGN: ICD-10-CM

## 2022-02-01 PROCEDURE — 93010 ELECTROCARDIOGRAM REPORT: CPT | Mod: S$GLB,,, | Performed by: INTERNAL MEDICINE

## 2022-02-01 PROCEDURE — 93010 EKG 12-LEAD: ICD-10-PCS | Mod: S$GLB,,, | Performed by: INTERNAL MEDICINE

## 2022-05-04 ENCOUNTER — PATIENT MESSAGE (OUTPATIENT)
Dept: SURGERY | Facility: CLINIC | Age: 72
End: 2022-05-04
Payer: MEDICARE

## 2022-05-04 ENCOUNTER — TELEPHONE (OUTPATIENT)
Dept: SURGERY | Facility: CLINIC | Age: 72
End: 2022-05-04
Payer: MEDICARE

## 2022-05-04 NOTE — TELEPHONE ENCOUNTER
Changed patient's appointment to here at [facility], was originally scheduled in error on the [facility]. Patient notified of the change via portal since she didn't answer the phone.     ----- Message from Flores Howard sent at 5/4/2022  7:55 AM CDT -----  Regarding: Appt Inquiry  Pt called to confirm her annual mammogram on 9/14, mammogram is scheduled for the wrong location  pt needs [facility]. Appt will not move due to date restrictions. Pt would like to have mammogram and then see doctor after is willing to change date if needed.         Call back number:[phone]

## 2022-06-08 ENCOUNTER — NURSE TRIAGE (OUTPATIENT)
Dept: ADMINISTRATIVE | Facility: CLINIC | Age: 72
End: 2022-06-08
Payer: MEDICARE

## 2022-06-08 NOTE — TELEPHONE ENCOUNTER
Ingrown hair or boil on the side of rectum. Pt wants to know if it can be seen by Md early tomorrow morning or first thing Friday morning. Pain 8/10. She feels it when she sits and she walks.  Care advice recommend pt come into office now. Pt refused. Pt is requesting to come in tomorrow or Friday. Please call and advise pt.     Reason for Disposition   SEVERE pain (e.g., excruciating)    Additional Information   Negative: Sounds like a life-threatening emergency to the triager   Negative: Hernia suspected (bulge in groin or abdomen) and painful or vomiting   Negative: Swollen lump in groin and pulsating (like heartbeat)   Negative: Patient sounds very sick or weak to the triager    Protocols used: SKIN LUMP OR LOCALIZED SWELLING-A-OH

## 2022-06-09 ENCOUNTER — OFFICE VISIT (OUTPATIENT)
Dept: INTERNAL MEDICINE | Facility: CLINIC | Age: 72
End: 2022-06-09
Payer: MEDICARE

## 2022-06-09 VITALS
HEIGHT: 67 IN | WEIGHT: 134.5 LBS | HEART RATE: 80 BPM | BODY MASS INDEX: 21.11 KG/M2 | SYSTOLIC BLOOD PRESSURE: 118 MMHG | DIASTOLIC BLOOD PRESSURE: 62 MMHG | OXYGEN SATURATION: 99 %

## 2022-06-09 DIAGNOSIS — B96.89 LOCALIZED BACTERIAL SKIN INFECTION: Primary | ICD-10-CM

## 2022-06-09 DIAGNOSIS — Z76.89 ESTABLISHING CARE WITH NEW DOCTOR, ENCOUNTER FOR: ICD-10-CM

## 2022-06-09 DIAGNOSIS — Z85.3 HISTORY OF BREAST CANCER: ICD-10-CM

## 2022-06-09 DIAGNOSIS — Z13.9 ENCOUNTER FOR HEALTH-RELATED SCREENING: ICD-10-CM

## 2022-06-09 DIAGNOSIS — Z78.0 ASYMPTOMATIC MENOPAUSAL STATE: ICD-10-CM

## 2022-06-09 DIAGNOSIS — L08.9 LOCALIZED BACTERIAL SKIN INFECTION: Primary | ICD-10-CM

## 2022-06-09 PROCEDURE — 3288F PR FALLS RISK ASSESSMENT DOCUMENTED: ICD-10-PCS | Mod: CPTII,S$GLB,, | Performed by: NURSE PRACTITIONER

## 2022-06-09 PROCEDURE — 3008F PR BODY MASS INDEX (BMI) DOCUMENTED: ICD-10-PCS | Mod: CPTII,S$GLB,, | Performed by: NURSE PRACTITIONER

## 2022-06-09 PROCEDURE — 4010F ACE/ARB THERAPY RXD/TAKEN: CPT | Mod: CPTII,S$GLB,, | Performed by: NURSE PRACTITIONER

## 2022-06-09 PROCEDURE — 3074F PR MOST RECENT SYSTOLIC BLOOD PRESSURE < 130 MM HG: ICD-10-PCS | Mod: CPTII,S$GLB,, | Performed by: NURSE PRACTITIONER

## 2022-06-09 PROCEDURE — 99214 OFFICE O/P EST MOD 30 MIN: CPT | Mod: S$GLB,,, | Performed by: NURSE PRACTITIONER

## 2022-06-09 PROCEDURE — 1159F MED LIST DOCD IN RCRD: CPT | Mod: CPTII,S$GLB,, | Performed by: NURSE PRACTITIONER

## 2022-06-09 PROCEDURE — 3078F PR MOST RECENT DIASTOLIC BLOOD PRESSURE < 80 MM HG: ICD-10-PCS | Mod: CPTII,S$GLB,, | Performed by: NURSE PRACTITIONER

## 2022-06-09 PROCEDURE — 1101F PT FALLS ASSESS-DOCD LE1/YR: CPT | Mod: CPTII,S$GLB,, | Performed by: NURSE PRACTITIONER

## 2022-06-09 PROCEDURE — 3074F SYST BP LT 130 MM HG: CPT | Mod: CPTII,S$GLB,, | Performed by: NURSE PRACTITIONER

## 2022-06-09 PROCEDURE — 3008F BODY MASS INDEX DOCD: CPT | Mod: CPTII,S$GLB,, | Performed by: NURSE PRACTITIONER

## 2022-06-09 PROCEDURE — 1126F PR PAIN SEVERITY QUANTIFIED, NO PAIN PRESENT: ICD-10-PCS | Mod: CPTII,S$GLB,, | Performed by: NURSE PRACTITIONER

## 2022-06-09 PROCEDURE — 1159F PR MEDICATION LIST DOCUMENTED IN MEDICAL RECORD: ICD-10-PCS | Mod: CPTII,S$GLB,, | Performed by: NURSE PRACTITIONER

## 2022-06-09 PROCEDURE — 1101F PR PT FALLS ASSESS DOC 0-1 FALLS W/OUT INJ PAST YR: ICD-10-PCS | Mod: CPTII,S$GLB,, | Performed by: NURSE PRACTITIONER

## 2022-06-09 PROCEDURE — 99999 PR PBB SHADOW E&M-EST. PATIENT-LVL V: CPT | Mod: PBBFAC,,, | Performed by: NURSE PRACTITIONER

## 2022-06-09 PROCEDURE — 3288F FALL RISK ASSESSMENT DOCD: CPT | Mod: CPTII,S$GLB,, | Performed by: NURSE PRACTITIONER

## 2022-06-09 PROCEDURE — 99214 PR OFFICE/OUTPT VISIT, EST, LEVL IV, 30-39 MIN: ICD-10-PCS | Mod: S$GLB,,, | Performed by: NURSE PRACTITIONER

## 2022-06-09 PROCEDURE — 1126F AMNT PAIN NOTED NONE PRSNT: CPT | Mod: CPTII,S$GLB,, | Performed by: NURSE PRACTITIONER

## 2022-06-09 PROCEDURE — 3078F DIAST BP <80 MM HG: CPT | Mod: CPTII,S$GLB,, | Performed by: NURSE PRACTITIONER

## 2022-06-09 PROCEDURE — 4010F PR ACE/ARB THEARPY RXD/TAKEN: ICD-10-PCS | Mod: CPTII,S$GLB,, | Performed by: NURSE PRACTITIONER

## 2022-06-09 PROCEDURE — 99999 PR PBB SHADOW E&M-EST. PATIENT-LVL V: ICD-10-PCS | Mod: PBBFAC,,, | Performed by: NURSE PRACTITIONER

## 2022-06-09 RX ORDER — GABAPENTIN 300 MG/1
CAPSULE ORAL
COMMUNITY

## 2022-06-09 RX ORDER — OMEPRAZOLE 20 MG/1
CAPSULE, DELAYED RELEASE ORAL
COMMUNITY
Start: 2022-02-10

## 2022-06-09 RX ORDER — MULTIVITAMIN
1 TABLET ORAL
COMMUNITY

## 2022-06-09 RX ORDER — CELECOXIB 200 MG/1
CAPSULE ORAL
COMMUNITY

## 2022-06-09 RX ORDER — MUPIROCIN 20 MG/G
OINTMENT TOPICAL 3 TIMES DAILY
Qty: 15 G | Refills: 0 | Status: SHIPPED | OUTPATIENT
Start: 2022-06-09 | End: 2022-06-19

## 2022-06-09 RX ORDER — AMOXICILLIN AND CLAVULANATE POTASSIUM 875; 125 MG/1; MG/1
1 TABLET, FILM COATED ORAL 2 TIMES DAILY
Qty: 14 TABLET | Refills: 0 | Status: SHIPPED | OUTPATIENT
Start: 2022-06-09 | End: 2022-06-16

## 2022-06-09 RX ORDER — OLMESARTAN MEDOXOMIL 20 MG/1
TABLET ORAL
COMMUNITY
Start: 2021-12-29

## 2022-06-09 RX ORDER — TRAMADOL HYDROCHLORIDE 50 MG/1
50 TABLET ORAL EVERY 6 HOURS PRN
COMMUNITY
Start: 2022-01-06

## 2022-06-09 NOTE — PROGRESS NOTES
"INTERNAL MEDICINE PROGRESS/URGENT CARE NOTE    CHIEF COMPLAINT     Chief Complaint   Patient presents with    Rectal Problems       HPI     Tamra Benjamin is a 71 y.o. female who presents for an urgent visit today. She is a currently patient of Dr. Rubina Hager at [facility]. She states that she noticed a "bump" around her rectum approximately 2 days ago and that the area is tender. She denies a history of this problem in the past. She has been applying mupirocin ointment with mild but temporary relief. She denies fever, chills, nausea, vomiting, painful bowel movements, rectal bleeding, and urinary symptoms.     Past Medical History:  Past Medical History:   Diagnosis Date    Abnormal Pap smear of cervix     CKC in her 20s all normal since then    Allergy     Arthritis     Breast cancer 2017    DCIS - tx with lumpectomy and XRT    Cancer 07/2016    DCIS left breast    Colon polyps     Erosive esophagitis     Fever blister     Hyperlipidemia     Hypothyroidism     Joint pain     Multiple thyroid nodules        Home Medications:  Prior to Admission medications    Medication Sig Start Date End Date Taking? Authorizing Provider   ascorbic acid, vitamin C, (VITAMIN C) 100 MG tablet Take 100 mg by mouth once daily.   Yes Historical Provider   coenzyme Q10 10 mg capsule Take 10 mg by mouth once daily.   Yes Historical Provider   docusate sodium (COLACE) 50 MG capsule Take 2 capsules (100 mg total) by mouth 2 (two) times daily. 7/18/16  Yes Rey Chaidez MD   hydroCHLOROthiazide (HYDRODIURIL) 12.5 MG Tab TK 1 T PO D IN THE MORNING 7/27/20  Yes Historical Provider   minoxidiL (LONITEN) 2.5 MG tablet Take 2.5 mg by mouth once daily.   Yes Historical Provider   MV,ROMAIN,IRON,MN/FOLIC ACID/CHOL (BODY, HAIR, SKIN AND NAILS ORAL) Take 1 tablet by mouth.   Yes Historical Provider   pravastatin (PRAVACHOL) 40 MG tablet TK 1 T PO  HS 3/12/19  Yes Historical Provider   temazepam (RESTORIL) 30 mg " "capsule Take by mouth nightly as needed.  9/26/12  Yes Historical Provider   vitamin D 1000 units Tab Take 1,000 Units by mouth once daily.   Yes Historical Provider   levothyroxine (SYNTHROID) 88 MCG tablet Take 1 tablet (88 mcg total) by mouth before breakfast. Daily except Skip Sundays 1/21/19 9/9/20  Jackie Cintron MD       Review of Systems:  Review of Systems   Constitutional: Negative for chills and fever.   Gastrointestinal: Positive for rectal pain (around the rectum). Negative for abdominal pain, anal bleeding, blood in stool, constipation, diarrhea, nausea and vomiting.   Genitourinary: Negative for dysuria, flank pain, frequency and urgency.   Skin: Positive for wound. Negative for rash.       Health Maintainence:   Immunizations:  Health Maintenance       Date Due Completion Date    Hepatitis C Screening Never done ---    Shingles Vaccine (1 of 2) Never done ---    Pneumococcal Vaccines (Age 65+) (1 - PCV) Never done ---    DEXA Scan 10/29/2021 10/29/2018    COVID-19 Vaccine (4 - Booster for Pfizer series) 02/11/2022 10/11/2021    Influenza Vaccine (Season Ended) 09/01/2022 ---    Mammogram 09/13/2022 9/13/2021    Lipid Panel 02/01/2027 2/1/2022    TETANUS VACCINE 08/07/2027 8/7/2017 (Declined)    Override on 8/7/2017: Declined    Colorectal Cancer Screening 07/02/2028 7/2/2018           PHYSICAL EXAM     /62   Pulse 80   Ht 5' 7" (1.702 m)   Wt 61 kg (134 lb 7.7 oz)   SpO2 99%   BMI 21.06 kg/m²     Physical Exam  Vitals and nursing note reviewed.   Constitutional:       General: She is not in acute distress.     Appearance: She is well-developed and normal weight. She is not toxic-appearing.   HENT:      Head: Normocephalic and atraumatic.      Right Ear: External ear normal.      Left Ear: External ear normal.   Eyes:      General: No scleral icterus.     Extraocular Movements: Extraocular movements intact.      Conjunctiva/sclera: Conjunctivae normal.   Cardiovascular:      Rate and " Rhythm: Normal rate and regular rhythm.      Heart sounds: Normal heart sounds. No murmur heard.  Pulmonary:      Effort: Pulmonary effort is normal.      Breath sounds: Normal breath sounds. No wheezing, rhonchi or rales.   Abdominal:      General: Abdomen is flat. There is no distension.      Palpations: Abdomen is soft.      Tenderness: There is no abdominal tenderness. There is no guarding or rebound.   Genitourinary:     Rectum: External hemorrhoid (multiple non-thrombosed) present.       Musculoskeletal:         General: Normal range of motion.      Cervical back: Normal range of motion and neck supple.   Skin:     General: Skin is warm and dry.   Neurological:      General: No focal deficit present.      Mental Status: She is alert and oriented to person, place, and time.      Coordination: Coordination normal.      Gait: Gait normal.   Psychiatric:         Mood and Affect: Mood normal.         Behavior: Behavior normal.         LABS     No results found for: LABA1C, HGBA1C  CMP  Sodium   Date Value Ref Range Status   02/01/2022 141 136 - 145 mmol/L Final     Potassium   Date Value Ref Range Status   02/01/2022 4.8 3.5 - 5.1 mmol/L Final     Chloride   Date Value Ref Range Status   02/01/2022 105 95 - 110 mmol/L Final     CO2   Date Value Ref Range Status   02/01/2022 30 (H) 23 - 29 mmol/L Final     Glucose   Date Value Ref Range Status   02/01/2022 91 70 - 110 mg/dL Final     BUN   Date Value Ref Range Status   02/01/2022 21 8 - 23 mg/dL Final     Creatinine   Date Value Ref Range Status   02/01/2022 0.9 0.5 - 1.4 mg/dL Final     Calcium   Date Value Ref Range Status   02/01/2022 10.1 8.7 - 10.5 mg/dL Final     Total Protein   Date Value Ref Range Status   02/01/2022 7.2 6.0 - 8.4 g/dL Final     Albumin   Date Value Ref Range Status   02/01/2022 4.0 3.5 - 5.2 g/dL Final     Total Bilirubin   Date Value Ref Range Status   02/01/2022 0.5 0.1 - 1.0 mg/dL Final     Comment:     For infants and newborns,  interpretation of results should be based  on gestational age, weight and in agreement with clinical  observations.    Premature Infant recommended reference ranges:  Up to 24 hours.............<8.0 mg/dL  Up to 48 hours............<12.0 mg/dL  3-5 days..................<15.0 mg/dL  6-29 days.................<15.0 mg/dL       Alkaline Phosphatase   Date Value Ref Range Status   02/01/2022 61 55 - 135 U/L Final     AST   Date Value Ref Range Status   02/01/2022 18 10 - 40 U/L Final     ALT   Date Value Ref Range Status   02/01/2022 17 10 - 44 U/L Final     Anion Gap   Date Value Ref Range Status   02/01/2022 6 (L) 8 - 16 mmol/L Final     eGFR if    Date Value Ref Range Status   02/01/2022 >60.0 >60 mL/min/1.73 m^2 Final     eGFR if non    Date Value Ref Range Status   02/01/2022 >60.0 >60 mL/min/1.73 m^2 Final     Comment:     Calculation used to obtain the estimated glomerular filtration  rate (eGFR) is the CKD-EPI equation.        Lab Results   Component Value Date    WBC 5.39 02/01/2022    HGB 11.9 (L) 02/01/2022    HCT 36.5 (L) 02/01/2022    MCV 86 02/01/2022     02/01/2022     Lab Results   Component Value Date    CHOL 173 02/01/2022    CHOL 186 10/02/2017    CHOL 178 03/27/2017     Lab Results   Component Value Date    HDL 56 02/01/2022    HDL 48 10/02/2017    HDL 47 03/27/2017     Lab Results   Component Value Date    LDLCALC 98.0 02/01/2022    LDLCALC 108.4 10/02/2017    LDLCALC 114.2 03/27/2017     Lab Results   Component Value Date    TRIG 95 02/01/2022    TRIG 148 10/02/2017    TRIG 84 03/27/2017     Lab Results   Component Value Date    CHOLHDL 32.4 02/01/2022    CHOLHDL 25.8 10/02/2017    CHOLHDL 26.4 03/27/2017     Lab Results   Component Value Date    TSH 5.251 (H) 02/01/2022       ASSESSMENT/PLAN     Tamra Benjamin is a 71 y.o. female who presents to clinic for evaluation of painful lesion to perirectal area. On exam, she is afebrile and well appearing. 2 x  2 cm area of induration without fluctuance, surrounding erythema, or active drainage noted. Discussed treatment options with the patient, including performing I&D today as well as starting a trial of oral antibiotics for suspected abscess. Patient prefers to start antibiotics and without I&D at this time. Discussed the importance of applying warm compresses to the area multiple times daily, finishing full course of antibiotics, and returning to clinic for reevaluation in 4 days or sooner for signs/symptoms of worsening infection. ED precautions advised.     She is also due for DXA scan- orders placed. She also requests GYN referral- referral placed.    Verbalized understanding and all questions answered.     Tamra was seen today for rectal problems.    Diagnoses and all orders for this visit:    Localized bacterial skin infection  -     amoxicillin-clavulanate 875-125mg (AUGMENTIN) 875-125 mg per tablet; Take 1 tablet by mouth 2 (two) times daily. for 7 days  -     mupirocin (BACTROBAN) 2 % ointment; Apply topically 3 (three) times daily. To affected area for 10 days    Establishing care with new doctor, encounter for  -     Ambulatory referral/consult to Obstetrics / Gynecology; Future    Encounter for health-related screening  -     DXA Bone Density Spine And Hip; Future    History of breast cancer  -     DXA Bone Density Spine And Hip; Future    Asymptomatic menopausal state   -     DXA Bone Density Spine And Hip; Future

## 2022-06-09 NOTE — PATIENT INSTRUCTIONS
You have been given an [facility] doctor referral. If you don't hear from them in a few days call [phone]     Calcium 6813-7594 mg daily and vitamin D 800-1000 units daily, adequate exercise for bone strength.    Please make sure to maintain to keep the area clean. Wash with soap and water. If the abscess is in an area you shave then change your razor and do not shave the area until the wound is healed. Do not share towels or other personal items. Use warm compresses 10-15 minutes, 4-5 times a day to help increase wound drainage and decrease swelling, and take your antibiotic medication as prescribed.     Please go to the Emergency Department for any new or worsening problems including: worsening of your abscess, increasing redness or redness extending further up your body, or temperature of greater than 100.4F.    Please follow up with your Primary Care Doctor or Return to clinic in 2-3 days. Finish entire course of antibiotics

## 2022-06-13 ENCOUNTER — OFFICE VISIT (OUTPATIENT)
Dept: INTERNAL MEDICINE | Facility: CLINIC | Age: 72
End: 2022-06-13
Payer: MEDICARE

## 2022-06-13 VITALS
WEIGHT: 136 LBS | HEIGHT: 67 IN | BODY MASS INDEX: 21.35 KG/M2 | DIASTOLIC BLOOD PRESSURE: 62 MMHG | HEART RATE: 80 BPM | SYSTOLIC BLOOD PRESSURE: 124 MMHG

## 2022-06-13 DIAGNOSIS — L03.317 ABSCESS AND CELLULITIS OF GLUTEAL REGION: ICD-10-CM

## 2022-06-13 DIAGNOSIS — L02.31 ABSCESS AND CELLULITIS OF GLUTEAL REGION: ICD-10-CM

## 2022-06-13 DIAGNOSIS — Z09 FOLLOW-UP EXAM: Primary | ICD-10-CM

## 2022-06-13 PROCEDURE — 3078F DIAST BP <80 MM HG: CPT | Mod: CPTII,S$GLB,, | Performed by: NURSE PRACTITIONER

## 2022-06-13 PROCEDURE — 4010F ACE/ARB THERAPY RXD/TAKEN: CPT | Mod: CPTII,S$GLB,, | Performed by: NURSE PRACTITIONER

## 2022-06-13 PROCEDURE — 3008F BODY MASS INDEX DOCD: CPT | Mod: CPTII,S$GLB,, | Performed by: NURSE PRACTITIONER

## 2022-06-13 PROCEDURE — 1159F MED LIST DOCD IN RCRD: CPT | Mod: CPTII,S$GLB,, | Performed by: NURSE PRACTITIONER

## 2022-06-13 PROCEDURE — 3288F FALL RISK ASSESSMENT DOCD: CPT | Mod: CPTII,S$GLB,, | Performed by: NURSE PRACTITIONER

## 2022-06-13 PROCEDURE — 1159F PR MEDICATION LIST DOCUMENTED IN MEDICAL RECORD: ICD-10-PCS | Mod: CPTII,S$GLB,, | Performed by: NURSE PRACTITIONER

## 2022-06-13 PROCEDURE — 99999 PR PBB SHADOW E&M-EST. PATIENT-LVL IV: CPT | Mod: PBBFAC,,, | Performed by: NURSE PRACTITIONER

## 2022-06-13 PROCEDURE — 4010F PR ACE/ARB THEARPY RXD/TAKEN: ICD-10-PCS | Mod: CPTII,S$GLB,, | Performed by: NURSE PRACTITIONER

## 2022-06-13 PROCEDURE — 3074F PR MOST RECENT SYSTOLIC BLOOD PRESSURE < 130 MM HG: ICD-10-PCS | Mod: CPTII,S$GLB,, | Performed by: NURSE PRACTITIONER

## 2022-06-13 PROCEDURE — 99213 OFFICE O/P EST LOW 20 MIN: CPT | Mod: S$GLB,,, | Performed by: NURSE PRACTITIONER

## 2022-06-13 PROCEDURE — 3074F SYST BP LT 130 MM HG: CPT | Mod: CPTII,S$GLB,, | Performed by: NURSE PRACTITIONER

## 2022-06-13 PROCEDURE — 1160F RVW MEDS BY RX/DR IN RCRD: CPT | Mod: CPTII,S$GLB,, | Performed by: NURSE PRACTITIONER

## 2022-06-13 PROCEDURE — 1160F PR REVIEW ALL MEDS BY PRESCRIBER/CLIN PHARMACIST DOCUMENTED: ICD-10-PCS | Mod: CPTII,S$GLB,, | Performed by: NURSE PRACTITIONER

## 2022-06-13 PROCEDURE — 3008F PR BODY MASS INDEX (BMI) DOCUMENTED: ICD-10-PCS | Mod: CPTII,S$GLB,, | Performed by: NURSE PRACTITIONER

## 2022-06-13 PROCEDURE — 1101F PR PT FALLS ASSESS DOC 0-1 FALLS W/OUT INJ PAST YR: ICD-10-PCS | Mod: CPTII,S$GLB,, | Performed by: NURSE PRACTITIONER

## 2022-06-13 PROCEDURE — 3288F PR FALLS RISK ASSESSMENT DOCUMENTED: ICD-10-PCS | Mod: CPTII,S$GLB,, | Performed by: NURSE PRACTITIONER

## 2022-06-13 PROCEDURE — 1101F PT FALLS ASSESS-DOCD LE1/YR: CPT | Mod: CPTII,S$GLB,, | Performed by: NURSE PRACTITIONER

## 2022-06-13 PROCEDURE — 99999 PR PBB SHADOW E&M-EST. PATIENT-LVL IV: ICD-10-PCS | Mod: PBBFAC,,, | Performed by: NURSE PRACTITIONER

## 2022-06-13 PROCEDURE — 99213 PR OFFICE/OUTPT VISIT, EST, LEVL III, 20-29 MIN: ICD-10-PCS | Mod: S$GLB,,, | Performed by: NURSE PRACTITIONER

## 2022-06-13 PROCEDURE — 3078F PR MOST RECENT DIASTOLIC BLOOD PRESSURE < 80 MM HG: ICD-10-PCS | Mod: CPTII,S$GLB,, | Performed by: NURSE PRACTITIONER

## 2022-06-13 RX ORDER — ACETAMINOPHEN 500 MG
TABLET ORAL
COMMUNITY

## 2022-06-13 RX ORDER — CLINDAMYCIN HYDROCHLORIDE 300 MG/1
CAPSULE ORAL
COMMUNITY

## 2022-06-13 RX ORDER — IBUPROFEN 800 MG/1
TABLET ORAL
COMMUNITY
Start: 2022-01-06

## 2022-06-13 NOTE — PROGRESS NOTES
INTERNAL MEDICINE PROGRESS/URGENT CARE NOTE    CHIEF COMPLAINT     Chief Complaint   Patient presents with    Follow-up       HPI     Tamra Benjamin is a 71 y.o. female who presents for a follow up visit today. She was seen in clinic for an abscess on her buttocks on 06/09/2022 and deferred I&D at that time. She was prescribed Augmentin and mupirocin ointment and today she reports complete resolution of symptoms. She denies pain, fever, chills.       Past Medical History:  Past Medical History:   Diagnosis Date    Abnormal Pap smear of cervix     CKC in her 20s all normal since then    Allergy     Arthritis     Breast cancer 2017    DCIS - tx with lumpectomy and XRT    Cancer 07/2016    DCIS left breast    Colon polyps     Erosive esophagitis     Fever blister     Hyperlipidemia     Hypothyroidism     Joint pain     Multiple thyroid nodules        Home Medications:  Prior to Admission medications    Medication Sig Start Date End Date Taking? Authorizing Provider   acetaminophen (TYLENOL) 500 MG tablet acetaminophen 500 mg tablet   Take 2 tablets every 8 hours by oral route for 30 days.   Yes Historical Provider   amoxicillin-clavulanate 875-125mg (AUGMENTIN) 875-125 mg per tablet Take 1 tablet by mouth 2 (two) times daily. for 7 days 6/9/22 6/16/22 Yes Tonja Luna NP   ascorbic acid, vitamin C, (VITAMIN C) 100 MG tablet Take 100 mg by mouth once daily.   Yes Historical Provider   celecoxib (CELEBREX) 200 MG capsule Celebrex 200 mg capsule   Take 1 capsule twice a day by oral route.   Yes Historical Provider   clindamycin (CLEOCIN) 300 MG capsule clindamycin HCl 300 mg capsule   Yes Historical Provider   coenzyme Q10 10 mg capsule Take 10 mg by mouth once daily.   Yes Historical Provider   docusate sodium (COLACE) 50 MG capsule Take 2 capsules (100 mg total) by mouth 2 (two) times daily. 7/18/16  Yes Rey Chaidez MD   gabapentin (NEURONTIN) 300 MG capsule gabapentin 300 mg capsule   Yes  Historical Provider   hydroCHLOROthiazide (HYDRODIURIL) 12.5 MG Tab TK 1 T PO D IN THE MORNING 7/27/20  Yes Historical Provider   ibuprofen (ADVIL,MOTRIN) 800 MG tablet ibuprofen 800 mg tablet   TAKE 1 TABLET BY MOUTH EVERY 8 HOURS AS NEEDED FOR PAIN 1/6/22  Yes Historical Provider   minoxidiL (LONITEN) 2.5 MG tablet Take 2.5 mg by mouth once daily.   Yes Historical Provider   multivitamin (THERAGRAN) per tablet Take 1 tablet by mouth.   Yes Historical Provider   mupirocin (BACTROBAN) 2 % ointment Apply topically 3 (three) times daily. To affected area for 10 days 6/9/22 6/19/22 Yes Tonja Luna NP   MV,ROMAIN,IRON,MN/FOLIC ACID/CHOL (BODY, HAIR, SKIN AND NAILS ORAL) Take 1 tablet by mouth.   Yes Historical Provider   olmesartan (BENICAR) 20 MG tablet olmesartan 20 mg tablet 12/29/21  Yes Historical Provider   omeprazole (PRILOSEC) 20 MG capsule omeprazole 20 mg capsule,delayed release 2/10/22  Yes Historical Provider   pravastatin (PRAVACHOL) 40 MG tablet TK 1 T PO  HS 3/12/19  Yes Historical Provider   temazepam (RESTORIL) 30 mg capsule Take by mouth nightly as needed.  9/26/12  Yes Historical Provider   traMADoL (ULTRAM) 50 mg tablet Take 50 mg by mouth every 6 (six) hours as needed. 1/6/22  Yes Historical Provider   vitamin D 1000 units Tab Take 1,000 Units by mouth once daily.   Yes Historical Provider   levothyroxine (SYNTHROID) 88 MCG tablet Take 1 tablet (88 mcg total) by mouth before breakfast. Daily except Skip Sundays 1/21/19 9/9/20  Jackie Cintron MD       Review of Systems:  Review of Systems   Constitutional: Negative for chills and fever.   Gastrointestinal: Negative for nausea, rectal pain and vomiting.   Genitourinary: Negative for dysuria, flank pain, frequency and urgency.   Skin: Positive for wound (2 x 2 cm abcess to buttocks, resolved). Negative for color change and rash.   Neurological: Negative for dizziness, numbness and headaches.       Health Maintainence:   Immunizations:  Health  "Maintenance       Date Due Completion Date    Hepatitis C Screening Never done ---    DEXA Scan 10/29/2021 10/29/2018    COVID-19 Vaccine (4 - Booster for Pfizer series) 02/11/2022 10/11/2021    Shingles Vaccine (1 of 2) 06/09/2023 (Originally 9/3/1998) ---    Pneumococcal Vaccines (Age 65+) (1 - PCV) 06/09/2023 (Originally 9/3/2013) ---    Influenza Vaccine (Season Ended) 09/01/2022 ---    Mammogram 09/13/2022 9/13/2021    Lipid Panel 02/01/2027 2/1/2022    TETANUS VACCINE 08/07/2027 8/7/2017 (Declined)    Override on 8/7/2017: Declined    Colorectal Cancer Screening 07/02/2028 7/2/2018           PHYSICAL EXAM     /62   Pulse 80   Ht 5' 7" (1.702 m)   Wt 61.7 kg (136 lb 0.4 oz)   BMI 21.30 kg/m²     Physical Exam  Vitals and nursing note reviewed.   Constitutional:       General: She is not in acute distress.     Appearance: Normal appearance. She is normal weight. She is not toxic-appearing.   HENT:      Head: Normocephalic and atraumatic.      Right Ear: External ear normal.      Left Ear: External ear normal.   Eyes:      General: No scleral icterus.     Extraocular Movements: Extraocular movements intact.      Conjunctiva/sclera: Conjunctivae normal.   Cardiovascular:      Rate and Rhythm: Normal rate.   Pulmonary:      Effort: Pulmonary effort is normal. No respiratory distress.   Genitourinary:     Comments: Deferred external rectal exam, states she no longer has area of erythema, induration to buttocks  Musculoskeletal:         General: Normal range of motion.      Cervical back: Normal range of motion and neck supple.   Skin:     General: Skin is warm and dry.   Neurological:      General: No focal deficit present.      Mental Status: She is alert and oriented to person, place, and time.      Coordination: Coordination normal.      Gait: Gait normal.   Psychiatric:         Mood and Affect: Mood normal.         Behavior: Behavior normal.         LABS     No results found for: LABA1C, " HGBA1C  CMP  Sodium   Date Value Ref Range Status   02/01/2022 141 136 - 145 mmol/L Final     Potassium   Date Value Ref Range Status   02/01/2022 4.8 3.5 - 5.1 mmol/L Final     Chloride   Date Value Ref Range Status   02/01/2022 105 95 - 110 mmol/L Final     CO2   Date Value Ref Range Status   02/01/2022 30 (H) 23 - 29 mmol/L Final     Glucose   Date Value Ref Range Status   02/01/2022 91 70 - 110 mg/dL Final     BUN   Date Value Ref Range Status   02/01/2022 21 8 - 23 mg/dL Final     Creatinine   Date Value Ref Range Status   02/01/2022 0.9 0.5 - 1.4 mg/dL Final     Calcium   Date Value Ref Range Status   02/01/2022 10.1 8.7 - 10.5 mg/dL Final     Total Protein   Date Value Ref Range Status   02/01/2022 7.2 6.0 - 8.4 g/dL Final     Albumin   Date Value Ref Range Status   02/01/2022 4.0 3.5 - 5.2 g/dL Final     Total Bilirubin   Date Value Ref Range Status   02/01/2022 0.5 0.1 - 1.0 mg/dL Final     Comment:     For infants and newborns, interpretation of results should be based  on gestational age, weight and in agreement with clinical  observations.    Premature Infant recommended reference ranges:  Up to 24 hours.............<8.0 mg/dL  Up to 48 hours............<12.0 mg/dL  3-5 days..................<15.0 mg/dL  6-29 days.................<15.0 mg/dL       Alkaline Phosphatase   Date Value Ref Range Status   02/01/2022 61 55 - 135 U/L Final     AST   Date Value Ref Range Status   02/01/2022 18 10 - 40 U/L Final     ALT   Date Value Ref Range Status   02/01/2022 17 10 - 44 U/L Final     Anion Gap   Date Value Ref Range Status   02/01/2022 6 (L) 8 - 16 mmol/L Final     eGFR if    Date Value Ref Range Status   02/01/2022 >60.0 >60 mL/min/1.73 m^2 Final     eGFR if non    Date Value Ref Range Status   02/01/2022 >60.0 >60 mL/min/1.73 m^2 Final     Comment:     Calculation used to obtain the estimated glomerular filtration  rate (eGFR) is the CKD-EPI equation.        Lab Results    Component Value Date    WBC 5.39 02/01/2022    HGB 11.9 (L) 02/01/2022    HCT 36.5 (L) 02/01/2022    MCV 86 02/01/2022     02/01/2022     Lab Results   Component Value Date    CHOL 173 02/01/2022    CHOL 186 10/02/2017    CHOL 178 03/27/2017     Lab Results   Component Value Date    HDL 56 02/01/2022    HDL 48 10/02/2017    HDL 47 03/27/2017     Lab Results   Component Value Date    LDLCALC 98.0 02/01/2022    LDLCALC 108.4 10/02/2017    LDLCALC 114.2 03/27/2017     Lab Results   Component Value Date    TRIG 95 02/01/2022    TRIG 148 10/02/2017    TRIG 84 03/27/2017     Lab Results   Component Value Date    CHOLHDL 32.4 02/01/2022    CHOLHDL 25.8 10/02/2017    CHOLHDL 26.4 03/27/2017     Lab Results   Component Value Date    TSH 5.251 (H) 02/01/2022       ASSESSMENT/PLAN     Tamra Benjamin is a 71 y.o. female who presents for follow up visit. She is afebrile and well appearing. She states her abscess has resolved using topical and oral antibiotics as prescribed. Advised she should finish entire course of antibiotics and follow up with PCP as needed. She reports she has an appointment for DEXA scan as well as OB/GYN after referrals were placed during last visit. She also states she spoke with her PCP Dr. Hager at [facility] who will fax her records to our office. Verbalized understanding and all questions answered.              Department of Internal Medicine - [facility]  8:11 AM

## 2022-07-11 ENCOUNTER — HOSPITAL ENCOUNTER (OUTPATIENT)
Dept: RADIOLOGY | Facility: CLINIC | Age: 72
Discharge: HOME | End: 2022-07-11
Attending: NURSE PRACTITIONER
Payer: MEDICARE

## 2022-07-11 DIAGNOSIS — Z13.9 ENCOUNTER FOR HEALTH-RELATED SCREENING: ICD-10-CM

## 2022-07-11 DIAGNOSIS — Z85.3 HISTORY OF BREAST CANCER: ICD-10-CM

## 2022-07-11 DIAGNOSIS — Z78.0 ASYMPTOMATIC MENOPAUSAL STATE: ICD-10-CM

## 2022-07-11 PROCEDURE — 77080 DXA BONE DENSITY AXIAL: CPT | Mod: 26,,, | Performed by: INTERNAL MEDICINE

## 2022-07-11 PROCEDURE — 77080 DXA BONE DENSITY AXIAL: CPT | Mod: TC

## 2022-07-11 PROCEDURE — 77080 DEXA BONE DENSITY SPINE HIP: ICD-10-PCS | Mod: 26,,, | Performed by: INTERNAL MEDICINE

## 2022-07-15 NOTE — PROGRESS NOTES
Dr. Mulligan,    I saw your patient in clinic and ordered DXA scan. Abnormalities noted, how would you like to proceed?    Many thanks,    Tonja Luna NP

## 2022-08-01 ENCOUNTER — TELEPHONE (OUTPATIENT)
Dept: INTERNAL MEDICINE | Facility: CLINIC | Age: 72
End: 2022-08-01
Payer: MEDICARE

## 2022-08-01 NOTE — TELEPHONE ENCOUNTER
----- Message from Heraclio Mulligan MD sent at 7/31/2022 10:21 AM CDT -----  Please contact patient. Her bone density test showed that she has osteoporosis; but, at risk for fracture. I have not seen her in several years. Please schedule a follow up appointment with me unless she has a PCP outside of [facility]. If she h[facility], she will need to follow up with that person.

## 2022-09-12 ENCOUNTER — OFFICE VISIT (OUTPATIENT)
Dept: SURGERY | Facility: CLINIC | Age: 72
End: 2022-09-12
Payer: MEDICARE

## 2022-09-12 ENCOUNTER — HOSPITAL ENCOUNTER (OUTPATIENT)
Dept: RADIOLOGY | Facility: HOSPITAL | Age: 72
Discharge: HOME | End: 2022-09-12
Attending: NURSE PRACTITIONER
Payer: MEDICARE

## 2022-09-12 VITALS
HEIGHT: 67 IN | HEART RATE: 75 BPM | BODY MASS INDEX: 20.72 KG/M2 | SYSTOLIC BLOOD PRESSURE: 111 MMHG | DIASTOLIC BLOOD PRESSURE: 60 MMHG | WEIGHT: 132 LBS

## 2022-09-12 DIAGNOSIS — Z12.31 ENCOUNTER FOR SCREENING MAMMOGRAM FOR MALIGNANT NEOPLASM OF BREAST: ICD-10-CM

## 2022-09-12 DIAGNOSIS — Z12.39 SCREENING BREAST EXAMINATION: ICD-10-CM

## 2022-09-12 DIAGNOSIS — Z98.890 S/P LUMPECTOMY, LEFT BREAST: ICD-10-CM

## 2022-09-12 DIAGNOSIS — Z85.3 PERSONAL HISTORY OF BREAST CANCER: Primary | ICD-10-CM

## 2022-09-12 PROCEDURE — 3078F PR MOST RECENT DIASTOLIC BLOOD PRESSURE < 80 MM HG: ICD-10-PCS | Mod: CPTII,S$GLB,, | Performed by: PHYSICIAN ASSISTANT

## 2022-09-12 PROCEDURE — 3008F PR BODY MASS INDEX (BMI) DOCUMENTED: ICD-10-PCS | Mod: CPTII,S$GLB,, | Performed by: PHYSICIAN ASSISTANT

## 2022-09-12 PROCEDURE — 77063 BREAST TOMOSYNTHESIS BI: CPT | Mod: TC

## 2022-09-12 PROCEDURE — 3008F BODY MASS INDEX DOCD: CPT | Mod: CPTII,S$GLB,, | Performed by: PHYSICIAN ASSISTANT

## 2022-09-12 PROCEDURE — 77063 MAMMO DIGITAL SCREENING BILAT WITH TOMO: ICD-10-PCS | Mod: 26,,, | Performed by: RADIOLOGY

## 2022-09-12 PROCEDURE — 3074F SYST BP LT 130 MM HG: CPT | Mod: CPTII,S$GLB,, | Performed by: PHYSICIAN ASSISTANT

## 2022-09-12 PROCEDURE — 3288F FALL RISK ASSESSMENT DOCD: CPT | Mod: CPTII,S$GLB,, | Performed by: PHYSICIAN ASSISTANT

## 2022-09-12 PROCEDURE — 99213 OFFICE O/P EST LOW 20 MIN: CPT | Mod: S$GLB,,, | Performed by: PHYSICIAN ASSISTANT

## 2022-09-12 PROCEDURE — 77067 SCR MAMMO BI INCL CAD: CPT | Mod: TC

## 2022-09-12 PROCEDURE — 77067 SCR MAMMO BI INCL CAD: CPT | Mod: 26,,, | Performed by: RADIOLOGY

## 2022-09-12 PROCEDURE — 1101F PR PT FALLS ASSESS DOC 0-1 FALLS W/OUT INJ PAST YR: ICD-10-PCS | Mod: CPTII,S$GLB,, | Performed by: PHYSICIAN ASSISTANT

## 2022-09-12 PROCEDURE — 77067 MAMMO DIGITAL SCREENING BILAT WITH TOMO: ICD-10-PCS | Mod: 26,,, | Performed by: RADIOLOGY

## 2022-09-12 PROCEDURE — 4010F PR ACE/ARB THEARPY RXD/TAKEN: ICD-10-PCS | Mod: CPTII,S$GLB,, | Performed by: PHYSICIAN ASSISTANT

## 2022-09-12 PROCEDURE — 1101F PT FALLS ASSESS-DOCD LE1/YR: CPT | Mod: CPTII,S$GLB,, | Performed by: PHYSICIAN ASSISTANT

## 2022-09-12 PROCEDURE — 1159F PR MEDICATION LIST DOCUMENTED IN MEDICAL RECORD: ICD-10-PCS | Mod: CPTII,S$GLB,, | Performed by: PHYSICIAN ASSISTANT

## 2022-09-12 PROCEDURE — 1160F RVW MEDS BY RX/DR IN RCRD: CPT | Mod: CPTII,S$GLB,, | Performed by: PHYSICIAN ASSISTANT

## 2022-09-12 PROCEDURE — 3288F PR FALLS RISK ASSESSMENT DOCUMENTED: ICD-10-PCS | Mod: CPTII,S$GLB,, | Performed by: PHYSICIAN ASSISTANT

## 2022-09-12 PROCEDURE — 1126F PR PAIN SEVERITY QUANTIFIED, NO PAIN PRESENT: ICD-10-PCS | Mod: CPTII,S$GLB,, | Performed by: PHYSICIAN ASSISTANT

## 2022-09-12 PROCEDURE — 99999 PR PBB SHADOW E&M-EST. PATIENT-LVL IV: ICD-10-PCS | Mod: PBBFAC,,, | Performed by: PHYSICIAN ASSISTANT

## 2022-09-12 PROCEDURE — 1160F PR REVIEW ALL MEDS BY PRESCRIBER/CLIN PHARMACIST DOCUMENTED: ICD-10-PCS | Mod: CPTII,S$GLB,, | Performed by: PHYSICIAN ASSISTANT

## 2022-09-12 PROCEDURE — 99213 PR OFFICE/OUTPT VISIT, EST, LEVL III, 20-29 MIN: ICD-10-PCS | Mod: S$GLB,,, | Performed by: PHYSICIAN ASSISTANT

## 2022-09-12 PROCEDURE — 99999 PR PBB SHADOW E&M-EST. PATIENT-LVL IV: CPT | Mod: PBBFAC,,, | Performed by: PHYSICIAN ASSISTANT

## 2022-09-12 PROCEDURE — 1159F MED LIST DOCD IN RCRD: CPT | Mod: CPTII,S$GLB,, | Performed by: PHYSICIAN ASSISTANT

## 2022-09-12 PROCEDURE — 77063 BREAST TOMOSYNTHESIS BI: CPT | Mod: 26,,, | Performed by: RADIOLOGY

## 2022-09-12 PROCEDURE — 3074F PR MOST RECENT SYSTOLIC BLOOD PRESSURE < 130 MM HG: ICD-10-PCS | Mod: CPTII,S$GLB,, | Performed by: PHYSICIAN ASSISTANT

## 2022-09-12 PROCEDURE — 3078F DIAST BP <80 MM HG: CPT | Mod: CPTII,S$GLB,, | Performed by: PHYSICIAN ASSISTANT

## 2022-09-12 PROCEDURE — 4010F ACE/ARB THERAPY RXD/TAKEN: CPT | Mod: CPTII,S$GLB,, | Performed by: PHYSICIAN ASSISTANT

## 2022-09-12 PROCEDURE — 1126F AMNT PAIN NOTED NONE PRSNT: CPT | Mod: CPTII,S$GLB,, | Performed by: PHYSICIAN ASSISTANT

## 2022-09-12 NOTE — PROGRESS NOTES
[facility]  Department of Surgery    REFERRING PROVIDER: No referring provider defined for this encounter. Heraclio Mulligan MD  CHIEF COMPLAINT:   No chief complaint on file.      DIAGNOSIS:   This is a 72 y.o. female with a history of DCIS of the LEFT breast here today for follow up breast cancer screening.      HISTORY OF PRESENT ILLNESS:      Patient has a history of core biopsy left breast 2016 with DCIS, left lumpectomy 2016, adjuvant XRT. She did not take endocrine therapy. She was seen by Dr. Robins on 2019 due to left breast cyst and left breast pain. She had imaging with mammogram and ultrasound on 19 which showed left breast cyst measuring 35 mm x 21 mm x 9 mm cyst at the 8 o'clock position, this was read as BI-RADS 3 and short interval follow up was recommended. She had a follow up ultrasound on 3/2/2020 which showed decreased size of a left breast seroma at 8 o'clock, 8 cm from the nipple measuring 23 x 6 x 11 mm, previously 35 x 9 x 21 mm.      INTERVAL HISTORY:   Tamra Benjamin is a 72 y.o. female comes in for oncological follow up. Patient states she has been doing well since she was last seen. She denies change in her breast self-exam specifically denying new masses, skin or nipple changes, or nipple discharge. Past medical and surgical history is updated with no new changes. There have been no changes to family history. The patient denies constitutional symptoms of night sweats, chills, weight loss, new headaches, visual changes, new back or bony pain, chest pain, or shortness of breath.      Review of Systems: See HPI/Interval History for other systems reviewed.     IMAGIN/12/22 Bilateral Diagnostic Mammo:     Findings:  This procedure was performed using tomosynthesis. Computer-aided detection was utilized in the interpretation of this examination.  The breasts have scattered areas of fibroglandular density.      Left  There are post-surgical findings  from a previous lumpectomy with radiation seen in the lower inner quadrant of the left breast in the posterior depth. There has been no interval development of a suspicious mass, microcalcification, or architectural distortion.      Right  There is no evidence of suspicious masses, calcifications, or other abnormal findings in the right breast.      No detrimental change.      Impression:  Bilateral  There is no mammographic evidence of malignancy.     BI-RADS Category:   Overall: 2 - Benign    MEDICATIONS/ALLERGIES:     Current Outpatient Medications   Medication Sig    acetaminophen (TYLENOL) 500 MG tablet acetaminophen 500 mg tablet   Take 2 tablets every 8 hours by oral route for 30 days.    ascorbic acid, vitamin C, (VITAMIN C) 100 MG tablet Take 100 mg by mouth once daily.    celecoxib (CELEBREX) 200 MG capsule Celebrex 200 mg capsule   Take 1 capsule twice a day by oral route.    clindamycin (CLEOCIN) 300 MG capsule clindamycin HCl 300 mg capsule    coenzyme Q10 10 mg capsule Take 10 mg by mouth once daily.    docusate sodium (COLACE) 50 MG capsule Take 2 capsules (100 mg total) by mouth 2 (two) times daily.    gabapentin (NEURONTIN) 300 MG capsule gabapentin 300 mg capsule    hydroCHLOROthiazide (HYDRODIURIL) 12.5 MG Tab TK 1 T PO D IN THE MORNING    ibuprofen (ADVIL,MOTRIN) 800 MG tablet ibuprofen 800 mg tablet   TAKE 1 TABLET BY MOUTH EVERY 8 HOURS AS NEEDED FOR PAIN    levothyroxine (SYNTHROID) 88 MCG tablet Take 1 tablet (88 mcg total) by mouth before breakfast. Daily except Skip Sundays    minoxidiL (LONITEN) 2.5 MG tablet Take 2.5 mg by mouth once daily.    multivitamin (THERAGRAN) per tablet Take 1 tablet by mouth.    MV,ROMAIN,IRON,MN/FOLIC ACID/CHOL (BODY, HAIR, SKIN AND NAILS ORAL) Take 1 tablet by mouth.    olmesartan (BENICAR) 20 MG tablet olmesartan 20 mg tablet    omeprazole (PRILOSEC) 20 MG capsule omeprazole 20 mg capsule,delayed release    pravastatin (PRAVACHOL) 40 MG tablet TK 1 T PO  HS     "temazepam (RESTORIL) 30 mg capsule Take by mouth nightly as needed.     traMADoL (ULTRAM) 50 mg tablet Take 50 mg by mouth every 6 (six) hours as needed.    vitamin D 1000 units Tab Take 1,000 Units by mouth once daily.     No current facility-administered medications for this visit.      Review of patient's allergies indicates:   Allergen Reactions    Sulfa (sulfonamide antibiotics)      Difficulty breathing as a child       PHYSICAL EXAM:   /60 (BP Location: Right arm, Patient Position: Sitting, BP Method: Medium (Automatic))   Pulse 75   Ht 5' 7" (1.702 m)   Wt 59.9 kg (132 lb)   BMI 20.67 kg/m²     Physical Exam   Constitutional: She is oriented to person, place, and time. She appears well-developed.   HENT:   Head: Normocephalic.   Eyes: Pupils are equal, round, and reactive to light. Right eye exhibits no discharge. Left eye exhibits no discharge.   Cardiovascular:  Normal rate.            Pulmonary/Chest: Effort normal. No respiratory distress. She has no wheezes. She exhibits no tenderness. Right breast exhibits no inverted nipple, no mass, no nipple discharge, no skin change and no tenderness. Left breast exhibits no inverted nipple, no mass, no nipple discharge, no skin change and no tenderness. Breasts are symmetrical.       Musculoskeletal: Normal range of motion.   Lymphadenopathy:     She has no cervical adenopathy.   Neurological: She is alert and oriented to person, place, and time.   Skin: Skin is warm and dry. No erythema.     Psychiatric: Her behavior is normal.   Exam done in the upright and supine position.     ASSESSMENT:   This is a 72 y.o. female without evidence of recurrence by exam, history or imaging.       PLAN:   We discussed there was nothing concerning on exam or imaging today.     1. Continue monthly self breast exams and call the clinic with any new breast changes or problems.  2. Bilateral Screening Mammogram in 1 year .  3. Return to clinic in 1 year  for CBE    The " patient is in agreement with the plan. Questions were encouraged and answered to patient's satisfaction. Tamra will call our office with any questions or concerns.       Bobbi Jones PA-C  Breast Surgery

## 2023-02-08 DIAGNOSIS — I10 HTN (HYPERTENSION), BENIGN: ICD-10-CM

## 2023-05-18 DIAGNOSIS — Z12.31 SCREENING MAMMOGRAM, ENCOUNTER FOR: ICD-10-CM

## 2023-05-18 DIAGNOSIS — N64.4 BREAST PAIN, LEFT: ICD-10-CM

## 2023-05-18 DIAGNOSIS — Z98.890 S/P LUMPECTOMY, LEFT BREAST: Primary | ICD-10-CM

## 2023-05-22 ENCOUNTER — HOSPITAL ENCOUNTER (OUTPATIENT)
Dept: RADIOLOGY | Facility: HOSPITAL | Age: 73
Discharge: HOME | End: 2023-05-22
Attending: PHYSICIAN ASSISTANT
Payer: MEDICARE

## 2023-05-22 ENCOUNTER — OFFICE VISIT (OUTPATIENT)
Dept: SURGERY | Facility: CLINIC | Age: 73
End: 2023-05-22
Payer: MEDICARE

## 2023-05-22 VITALS
RESPIRATION RATE: 18 BRPM | WEIGHT: 132 LBS | DIASTOLIC BLOOD PRESSURE: 89 MMHG | OXYGEN SATURATION: 99 % | SYSTOLIC BLOOD PRESSURE: 151 MMHG | HEART RATE: 63 BPM | HEIGHT: 67 IN | BODY MASS INDEX: 20.72 KG/M2

## 2023-05-22 DIAGNOSIS — Z85.3 PERSONAL HISTORY OF BREAST CANCER: Primary | ICD-10-CM

## 2023-05-22 DIAGNOSIS — Z98.890 S/P LUMPECTOMY, LEFT BREAST: ICD-10-CM

## 2023-05-22 DIAGNOSIS — Z12.31 SCREENING MAMMOGRAM, ENCOUNTER FOR: ICD-10-CM

## 2023-05-22 DIAGNOSIS — Z12.39 SCREENING BREAST EXAMINATION: ICD-10-CM

## 2023-05-22 DIAGNOSIS — N64.4 BREAST PAIN, LEFT: ICD-10-CM

## 2023-05-22 PROCEDURE — 4010F ACE/ARB THERAPY RXD/TAKEN: CPT | Mod: CPTII,S$GLB,, | Performed by: PHYSICIAN ASSISTANT

## 2023-05-22 PROCEDURE — 77061 MAMMO DIGITAL DIAGNOSTIC LEFT WITH TOMO: ICD-10-PCS | Mod: 26,LT,, | Performed by: RADIOLOGY

## 2023-05-22 PROCEDURE — 77065 DX MAMMO INCL CAD UNI: CPT | Mod: 26,LT,, | Performed by: RADIOLOGY

## 2023-05-22 PROCEDURE — 3008F BODY MASS INDEX DOCD: CPT | Mod: CPTII,S$GLB,, | Performed by: PHYSICIAN ASSISTANT

## 2023-05-22 PROCEDURE — 99213 OFFICE O/P EST LOW 20 MIN: CPT | Mod: S$GLB,,, | Performed by: PHYSICIAN ASSISTANT

## 2023-05-22 PROCEDURE — 3077F PR MOST RECENT SYSTOLIC BLOOD PRESSURE >= 140 MM HG: ICD-10-PCS | Mod: CPTII,S$GLB,, | Performed by: PHYSICIAN ASSISTANT

## 2023-05-22 PROCEDURE — 99999 PR PBB SHADOW E&M-EST. PATIENT-LVL IV: CPT | Mod: PBBFAC,,, | Performed by: PHYSICIAN ASSISTANT

## 2023-05-22 PROCEDURE — 99213 PR OFFICE/OUTPT VISIT, EST, LEVL III, 20-29 MIN: ICD-10-PCS | Mod: S$GLB,,, | Performed by: PHYSICIAN ASSISTANT

## 2023-05-22 PROCEDURE — 3077F SYST BP >= 140 MM HG: CPT | Mod: CPTII,S$GLB,, | Performed by: PHYSICIAN ASSISTANT

## 2023-05-22 PROCEDURE — 3079F PR MOST RECENT DIASTOLIC BLOOD PRESSURE 80-89 MM HG: ICD-10-PCS | Mod: CPTII,S$GLB,, | Performed by: PHYSICIAN ASSISTANT

## 2023-05-22 PROCEDURE — 99999 PR PBB SHADOW E&M-EST. PATIENT-LVL IV: ICD-10-PCS | Mod: PBBFAC,,, | Performed by: PHYSICIAN ASSISTANT

## 2023-05-22 PROCEDURE — 76642 ULTRASOUND BREAST LIMITED: CPT | Mod: TC,LT

## 2023-05-22 PROCEDURE — 1159F PR MEDICATION LIST DOCUMENTED IN MEDICAL RECORD: ICD-10-PCS | Mod: CPTII,S$GLB,, | Performed by: PHYSICIAN ASSISTANT

## 2023-05-22 PROCEDURE — 1126F AMNT PAIN NOTED NONE PRSNT: CPT | Mod: CPTII,S$GLB,, | Performed by: PHYSICIAN ASSISTANT

## 2023-05-22 PROCEDURE — 1160F RVW MEDS BY RX/DR IN RCRD: CPT | Mod: CPTII,S$GLB,, | Performed by: PHYSICIAN ASSISTANT

## 2023-05-22 PROCEDURE — 99214 OFFICE O/P EST MOD 30 MIN: CPT | Performed by: PHYSICIAN ASSISTANT

## 2023-05-22 PROCEDURE — 77061 BREAST TOMOSYNTHESIS UNI: CPT | Mod: 26,LT,, | Performed by: RADIOLOGY

## 2023-05-22 PROCEDURE — 77065 MAMMO DIGITAL DIAGNOSTIC LEFT WITH TOMO: ICD-10-PCS | Mod: 26,LT,, | Performed by: RADIOLOGY

## 2023-05-22 PROCEDURE — 77061 BREAST TOMOSYNTHESIS UNI: CPT | Mod: TC,LT

## 2023-05-22 PROCEDURE — 1160F PR REVIEW ALL MEDS BY PRESCRIBER/CLIN PHARMACIST DOCUMENTED: ICD-10-PCS | Mod: CPTII,S$GLB,, | Performed by: PHYSICIAN ASSISTANT

## 2023-05-22 PROCEDURE — 1126F PR PAIN SEVERITY QUANTIFIED, NO PAIN PRESENT: ICD-10-PCS | Mod: CPTII,S$GLB,, | Performed by: PHYSICIAN ASSISTANT

## 2023-05-22 PROCEDURE — 3079F DIAST BP 80-89 MM HG: CPT | Mod: CPTII,S$GLB,, | Performed by: PHYSICIAN ASSISTANT

## 2023-05-22 PROCEDURE — 1159F MED LIST DOCD IN RCRD: CPT | Mod: CPTII,S$GLB,, | Performed by: PHYSICIAN ASSISTANT

## 2023-05-22 PROCEDURE — 3008F PR BODY MASS INDEX (BMI) DOCUMENTED: ICD-10-PCS | Mod: CPTII,S$GLB,, | Performed by: PHYSICIAN ASSISTANT

## 2023-05-22 PROCEDURE — 4010F PR ACE/ARB THEARPY RXD/TAKEN: ICD-10-PCS | Mod: CPTII,S$GLB,, | Performed by: PHYSICIAN ASSISTANT

## 2023-05-22 NOTE — PROGRESS NOTES
Pt has 2:45 appt today with Dr. Jones.  She is a [place] pt.  Did you want her to see Sol?   [facility]  Department of Surgery    REFERRING PROVIDER: No referring provider defined for this encounter. Heraclio Mulligan MD  CHIEF COMPLAINT:   No chief complaint on file.        DIAGNOSIS:   This is a 72 y.o. female with a history of DCIS of the LEFT breast here today for follow up breast cancer screening.      HISTORY OF PRESENT ILLNESS:      Patient has a history of core biopsy left breast 2016 with DCIS, left lumpectomy 2016, adjuvant XRT. She did not take endocrine therapy. She was seen by Dr. Robins on 2019 due to left breast cyst and left breast pain. She had imaging with mammogram and ultrasound on 19 which showed left breast cyst measuring 35 mm x 21 mm x 9 mm cyst at the 8 o'clock position, this was read as BI-RADS 3 and short interval follow up was recommended. She had a follow up ultrasound on 3/2/2020 which showed decreased size of a left breast seroma at 8 o'clock, 8 cm from the nipple measuring 23 x 6 x 11 mm, previously 35 x 9 x 21 mm.      INTERVAL HISTORY:   Tamra Benjamin is a 72 y.o. female comes in for evaluation of new left breast pain. Patient states this change was noted about a week ago. Denies palpable mass, skin changes or nipple discharge. She denies change in her breast self-exam specifically denying new masses, skin or nipple changes, or nipple discharge. The patient denies constitutional symptoms of night sweats, chills, weight loss, new headaches, visual changes, new back or bony pain, chest pain, or shortness of breath.      Review of Systems: See HPI/Interval History for other systems reviewed.     IMAGIN/12/22 Bilateral Diagnostic Mammo:     Findings:  This procedure was performed using tomosynthesis. Computer-aided detection was utilized in the interpretation of this examination.  The breasts have scattered areas of fibroglandular density.      Left  There are post-surgical findings from a previous lumpectomy with radiation seen in the  lower inner quadrant of the left breast in the posterior depth. There has been no interval development of a suspicious mass, microcalcification, or architectural distortion.      Right  There is no evidence of suspicious masses, calcifications, or other abnormal findings in the right breast.      No detrimental change.      Impression:  Bilateral  There is no mammographic evidence of malignancy.     BI-RADS Category:   Overall: 2 - Benign    MEDICATIONS/ALLERGIES:     Current Outpatient Medications   Medication Sig    acetaminophen (TYLENOL) 500 MG tablet acetaminophen 500 mg tablet   Take 2 tablets every 8 hours by oral route for 30 days.    ascorbic acid, vitamin C, (VITAMIN C) 100 MG tablet Take 100 mg by mouth once daily.    celecoxib (CELEBREX) 200 MG capsule Celebrex 200 mg capsule   Take 1 capsule twice a day by oral route.    clindamycin (CLEOCIN) 300 MG capsule clindamycin HCl 300 mg capsule    coenzyme Q10 10 mg capsule Take 10 mg by mouth once daily.    docusate sodium (COLACE) 50 MG capsule Take 2 capsules (100 mg total) by mouth 2 (two) times daily.    gabapentin (NEURONTIN) 300 MG capsule gabapentin 300 mg capsule    hydroCHLOROthiazide (HYDRODIURIL) 12.5 MG Tab TK 1 T PO D IN THE MORNING    ibuprofen (ADVIL,MOTRIN) 800 MG tablet ibuprofen 800 mg tablet   TAKE 1 TABLET BY MOUTH EVERY 8 HOURS AS NEEDED FOR PAIN    levothyroxine (SYNTHROID) 88 MCG tablet Take 1 tablet (88 mcg total) by mouth before breakfast. Daily except Skip Sundays    minoxidiL (LONITEN) 2.5 MG tablet Take 2.5 mg by mouth once daily.    multivitamin (THERAGRAN) per tablet Take 1 tablet by mouth.    MV,ROMAIN,IRON,MN/FOLIC ACID/CHOL (BODY, HAIR, SKIN AND NAILS ORAL) Take 1 tablet by mouth.    olmesartan (BENICAR) 20 MG tablet olmesartan 20 mg tablet    omeprazole (PRILOSEC) 20 MG capsule omeprazole 20 mg capsule,delayed release    pravastatin (PRAVACHOL) 40 MG tablet TK 1 T PO  HS    temazepam (RESTORIL) 30 mg capsule Take by mouth  nightly as needed.     traMADoL (ULTRAM) 50 mg tablet Take 50 mg by mouth every 6 (six) hours as needed.    vitamin D 1000 units Tab Take 1,000 Units by mouth once daily.     No current facility-administered medications for this visit.      Review of patient's allergies indicates:   Allergen Reactions    Sulfa (sulfonamide antibiotics)      Difficulty breathing as a child       PHYSICAL EXAM:   There were no vitals taken for this visit.    Physical Exam   Constitutional: She is oriented to person, place, and time. She appears well-developed.   HENT:   Head: Normocephalic.   Eyes: Pupils are equal, round, and reactive to light. Right eye exhibits no discharge. Left eye exhibits no discharge.   Cardiovascular:  Normal rate.            Pulmonary/Chest: Effort normal. No respiratory distress. She has no wheezes. She exhibits no tenderness. Right breast exhibits no inverted nipple, no mass, no nipple discharge, no skin change and no tenderness. Left breast exhibits no inverted nipple, no mass, no nipple discharge, no skin change and no tenderness. Breasts are symmetrical.       Musculoskeletal: Normal range of motion. Lymphadenopathy:      Cervical: No cervical adenopathy.     Neurological: She is alert and oriented to person, place, and time.   Skin: Skin is warm and dry. No erythema.     Psychiatric: Her behavior is normal.   Exam done in the upright and supine position.     ASSESSMENT:   This is a 72 y.o. female without evidence of recurrence by exam, history or imaging.       PLAN:   We discussed there was nothing concerning on exam or imaging today.     1. Continue monthly self breast exams and call the clinic with any new breast changes or problems.  2. Imaging work up today. Results still pending. Will be in contact with results.   3. Otherwise, return in September with bilateral screening mammogram.     The patient is in agreement with the plan. Questions were encouraged and answered to patient's satisfaction.  Tamra will call our office with any questions or concerns.       Bobbi Jones PA-C  Breast Surgery

## 2023-06-09 ENCOUNTER — TELEPHONE (OUTPATIENT)
Dept: SURGERY | Facility: CLINIC | Age: 73
End: 2023-06-09
Payer: MEDICARE

## 2023-06-09 NOTE — TELEPHONE ENCOUNTER
"----- Message from Mary Colindres sent at 6/9/2023  8:24 AM CDT -----  Regarding: Pt advice  Contact: Pt     Pt requesting orders for the following mammo in September. Please call and adv      Confirmed contact below:   Contact Name: Tamra Benjamin  Phone Number: [phone]               Additional Notes:  "Thank you for all that you do for our patients"                                           "

## 2023-06-19 ENCOUNTER — OFFICE VISIT (OUTPATIENT)
Dept: URGENT CARE | Facility: CLINIC | Age: 73
End: 2023-06-19
Payer: MEDICARE

## 2023-06-19 VITALS
HEIGHT: 65 IN | SYSTOLIC BLOOD PRESSURE: 126 MMHG | OXYGEN SATURATION: 96 % | WEIGHT: 130 LBS | HEART RATE: 76 BPM | BODY MASS INDEX: 21.66 KG/M2 | RESPIRATION RATE: 20 BRPM | DIASTOLIC BLOOD PRESSURE: 66 MMHG | TEMPERATURE: 98 F

## 2023-06-19 DIAGNOSIS — S89.91XA INJURY OF RIGHT KNEE, INITIAL ENCOUNTER: ICD-10-CM

## 2023-06-19 DIAGNOSIS — S50.812A ABRASION OF LEFT FOREARM, INITIAL ENCOUNTER: ICD-10-CM

## 2023-06-19 DIAGNOSIS — Z23 IMMUNIZATION DUE: Primary | ICD-10-CM

## 2023-06-19 PROCEDURE — 73562 XR KNEE 3 VIEW RIGHT: ICD-10-PCS | Mod: FY,RT,S$GLB, | Performed by: RADIOLOGY

## 2023-06-19 PROCEDURE — 90471 TDAP VACCINE GREATER THAN OR EQUAL TO 7YO IM: ICD-10-PCS | Mod: S$GLB,,, | Performed by: FAMILY MEDICINE

## 2023-06-19 PROCEDURE — 90715 TDAP VACCINE GREATER THAN OR EQUAL TO 7YO IM: ICD-10-PCS | Mod: S$GLB,,, | Performed by: FAMILY MEDICINE

## 2023-06-19 PROCEDURE — 90715 TDAP VACCINE 7 YRS/> IM: CPT | Mod: S$GLB,,, | Performed by: FAMILY MEDICINE

## 2023-06-19 PROCEDURE — 90471 IMMUNIZATION ADMIN: CPT | Mod: S$GLB,,, | Performed by: FAMILY MEDICINE

## 2023-06-19 PROCEDURE — 99214 OFFICE O/P EST MOD 30 MIN: CPT | Mod: 25,S$GLB,, | Performed by: FAMILY MEDICINE

## 2023-06-19 PROCEDURE — 73562 X-RAY EXAM OF KNEE 3: CPT | Mod: FY,RT,S$GLB, | Performed by: RADIOLOGY

## 2023-06-19 PROCEDURE — 99214 PR OFFICE/OUTPT VISIT, EST, LEVL IV, 30-39 MIN: ICD-10-PCS | Mod: 25,S$GLB,, | Performed by: FAMILY MEDICINE

## 2023-06-19 RX ORDER — MUPIROCIN 20 MG/G
OINTMENT TOPICAL 3 TIMES DAILY
Qty: 22 G | Refills: 0 | Status: SHIPPED | OUTPATIENT
Start: 2023-06-19

## 2023-06-20 ENCOUNTER — OFFICE VISIT (OUTPATIENT)
Dept: INTERNAL MEDICINE | Facility: CLINIC | Age: 73
End: 2023-06-20
Payer: MEDICARE

## 2023-06-20 VITALS
WEIGHT: 130.5 LBS | HEART RATE: 91 BPM | BODY MASS INDEX: 21.74 KG/M2 | DIASTOLIC BLOOD PRESSURE: 72 MMHG | SYSTOLIC BLOOD PRESSURE: 118 MMHG | HEIGHT: 65 IN | OXYGEN SATURATION: 98 %

## 2023-06-20 DIAGNOSIS — S51.812D SKIN TEAR OF LEFT FOREARM WITHOUT COMPLICATION, SUBSEQUENT ENCOUNTER: Primary | ICD-10-CM

## 2023-06-20 PROCEDURE — 3288F PR FALLS RISK ASSESSMENT DOCUMENTED: ICD-10-PCS | Mod: CPTII,S$GLB,, | Performed by: INTERNAL MEDICINE

## 2023-06-20 PROCEDURE — 3288F FALL RISK ASSESSMENT DOCD: CPT | Mod: CPTII,S$GLB,, | Performed by: INTERNAL MEDICINE

## 2023-06-20 PROCEDURE — 99214 PR OFFICE/OUTPT VISIT, EST, LEVL IV, 30-39 MIN: ICD-10-PCS | Mod: S$GLB,,, | Performed by: INTERNAL MEDICINE

## 2023-06-20 PROCEDURE — 3008F PR BODY MASS INDEX (BMI) DOCUMENTED: ICD-10-PCS | Mod: CPTII,S$GLB,, | Performed by: INTERNAL MEDICINE

## 2023-06-20 PROCEDURE — 99999 PR PBB SHADOW E&M-EST. PATIENT-LVL IV: CPT | Mod: PBBFAC,,, | Performed by: INTERNAL MEDICINE

## 2023-06-20 PROCEDURE — 99999 PR PBB SHADOW E&M-EST. PATIENT-LVL IV: ICD-10-PCS | Mod: PBBFAC,,, | Performed by: INTERNAL MEDICINE

## 2023-06-20 PROCEDURE — 4010F ACE/ARB THERAPY RXD/TAKEN: CPT | Mod: CPTII,S$GLB,, | Performed by: INTERNAL MEDICINE

## 2023-06-20 PROCEDURE — 4010F PR ACE/ARB THEARPY RXD/TAKEN: ICD-10-PCS | Mod: CPTII,S$GLB,, | Performed by: INTERNAL MEDICINE

## 2023-06-20 PROCEDURE — 1159F PR MEDICATION LIST DOCUMENTED IN MEDICAL RECORD: ICD-10-PCS | Mod: CPTII,S$GLB,, | Performed by: INTERNAL MEDICINE

## 2023-06-20 PROCEDURE — 3074F PR MOST RECENT SYSTOLIC BLOOD PRESSURE < 130 MM HG: ICD-10-PCS | Mod: CPTII,S$GLB,, | Performed by: INTERNAL MEDICINE

## 2023-06-20 PROCEDURE — 3078F DIAST BP <80 MM HG: CPT | Mod: CPTII,S$GLB,, | Performed by: INTERNAL MEDICINE

## 2023-06-20 PROCEDURE — 3008F BODY MASS INDEX DOCD: CPT | Mod: CPTII,S$GLB,, | Performed by: INTERNAL MEDICINE

## 2023-06-20 PROCEDURE — 1101F PT FALLS ASSESS-DOCD LE1/YR: CPT | Mod: CPTII,S$GLB,, | Performed by: INTERNAL MEDICINE

## 2023-06-20 PROCEDURE — 3074F SYST BP LT 130 MM HG: CPT | Mod: CPTII,S$GLB,, | Performed by: INTERNAL MEDICINE

## 2023-06-20 PROCEDURE — 1101F PR PT FALLS ASSESS DOC 0-1 FALLS W/OUT INJ PAST YR: ICD-10-PCS | Mod: CPTII,S$GLB,, | Performed by: INTERNAL MEDICINE

## 2023-06-20 PROCEDURE — 1126F PR PAIN SEVERITY QUANTIFIED, NO PAIN PRESENT: ICD-10-PCS | Mod: CPTII,S$GLB,, | Performed by: INTERNAL MEDICINE

## 2023-06-20 PROCEDURE — 3078F PR MOST RECENT DIASTOLIC BLOOD PRESSURE < 80 MM HG: ICD-10-PCS | Mod: CPTII,S$GLB,, | Performed by: INTERNAL MEDICINE

## 2023-06-20 PROCEDURE — 99214 OFFICE O/P EST MOD 30 MIN: CPT | Mod: S$GLB,,, | Performed by: INTERNAL MEDICINE

## 2023-06-20 PROCEDURE — 1126F AMNT PAIN NOTED NONE PRSNT: CPT | Mod: CPTII,S$GLB,, | Performed by: INTERNAL MEDICINE

## 2023-06-20 PROCEDURE — 1159F MED LIST DOCD IN RCRD: CPT | Mod: CPTII,S$GLB,, | Performed by: INTERNAL MEDICINE

## 2023-06-20 NOTE — PROGRESS NOTES
"Subjective:      Patient ID: Tamra Benjamin is a 72 y.o. female.    Vitals:  height is 5' 5" (1.651 m) and weight is 59 kg (130 lb). Her oral temperature is 97.5 °F (36.4 °C). Her blood pressure is 126/66 and her pulse is 76. Her respiration is 20 and oxygen saturation is 96%.     Chief Complaint: Laceration (Left forearm laceration) and Knee Injury (Right knee injury from a fall)    This is a 72 y.o. female who presents today with a chief complaint of left forearm laceration and right knee injury from a fall that happen today at her home.      Laceration   The incident occurred 1 to 3 hours ago. The laceration is located on the Left arm. The laceration is 10 cm in size. The laceration mechanism is unknown.The pain is at a severity of 2/10. The pain is mild. The pain has been Constant since onset. She reports no foreign bodies present. Her tetanus status is out of date.     Skin:  Positive for laceration.    Objective:     Physical Exam   Constitutional: She is oriented to person, place, and time. She does not appear ill. No distress. normal  HENT:   Head: Normocephalic and atraumatic.   Mouth/Throat: Mucous membranes are moist. No oropharyngeal exudate. Oropharynx is clear.   Eyes: Conjunctivae are normal. Pupils are equal, round, and reactive to light. Extraocular movement intact   Neck: Neck supple.   Pulmonary/Chest: Effort normal. No stridor. No respiratory distress.   Abdominal: Normal appearance. She exhibits no distension.   Musculoskeletal: Normal range of motion.         General: Signs of injury present. Normal range of motion.   Neurological: no focal deficit. She is alert, oriented to person, place, and time and at baseline.   Skin: Skin is warm. Capillary refill takes less than 2 seconds. Abrasions - upper ext.:  forearm (left) and elbow (left)  lesion   Psychiatric: Her behavior is normal. Mood, judgment and thought content normal.   Nursing note and vitals reviewed.    Assessment:     Plan:   1. " Immunization due  - (In Office Administered) Tdap Vaccine    2. Abrasion of left forearm, initial encounter  - (In Office Administered) Tdap Vaccine  - mupirocin (BACTROBAN) 2 % ointment; Apply topically 3 (three) times daily.  Dispense: 22 g; Refill: 0    3. Injury of right knee, initial encounter  - X-Ray Knee 3 View Right; Future   All results discussed prior to discharge from clinic.

## 2023-06-20 NOTE — PATIENT INSTRUCTIONS
Wash area with warm water and antibacterial soap, allow water to fall down off your hands. Do not scrub area. Pat dry with clean dry towel.     Wash twice a day    Cover with non-adherent dressing.     OK to use bactroban (mupirocin) ointment.     Will take a couple weeks to heal.     Return to clinic as needed.

## 2023-06-22 NOTE — PROGRESS NOTES
Subjective:       Patient ID: Tamra Benjamin is a 72 y.o. female.    Chief Complaint: Follow-up and Laceration      HPI  Tamra Benjamin is a 72 y.o. year old female with recent mechanical slip and fall, L fore arm with skin tear. Went to  last night but is worried about her healing. Unclear about wound care.     Review of Systems   Skin:  Positive for color change and wound.       Past Medical History:   Diagnosis Date    Abnormal Pap smear of cervix     CKC in her 20s all normal since then    Allergy     Arthritis     Breast cancer 2017    DCIS - tx with lumpectomy and XRT    Cancer 07/2016    DCIS left breast    Colon polyps     Erosive esophagitis     Fever blister     Hyperlipidemia     Hypothyroidism     Joint pain     Multiple thyroid nodules         Prior to Admission medications    Medication Sig Start Date End Date Taking? Authorizing Provider   acetaminophen (TYLENOL) 500 MG tablet acetaminophen 500 mg tablet   Take 2 tablets every 8 hours by oral route for 30 days.   Yes Historical Provider   ascorbic acid, vitamin C, (VITAMIN C) 100 MG tablet Take 100 mg by mouth once daily.   Yes Historical Provider   celecoxib (CELEBREX) 200 MG capsule Celebrex 200 mg capsule   Take 1 capsule twice a day by oral route.   Yes Historical Provider   hydroCHLOROthiazide (HYDRODIURIL) 12.5 MG Tab TK 1 T PO D IN THE MORNING 7/27/20  Yes Historical Provider   ibuprofen (ADVIL,MOTRIN) 800 MG tablet ibuprofen 800 mg tablet   TAKE 1 TABLET BY MOUTH EVERY 8 HOURS AS NEEDED FOR PAIN 1/6/22  Yes Historical Provider   multivitamin (THERAGRAN) per tablet Take 1 tablet by mouth.   Yes Historical Provider   mupirocin (BACTROBAN) 2 % ointment Apply topically 3 (three) times daily. 6/19/23  Yes Mary Andrade MD   MV,ROMAIN,IRON,MN/FOLIC ACID/CHOL (BODY, HAIR, SKIN AND NAILS ORAL) Take 1 tablet by mouth.   Yes Historical Provider   olmesartan (BENICAR) 20 MG tablet olmesartan 20 mg tablet 12/29/21  Yes Historical  "Provider   omeprazole (PRILOSEC) 20 MG capsule omeprazole 20 mg capsule,delayed release 2/10/22  Yes Historical Provider   pravastatin (PRAVACHOL) 40 MG tablet TK 1 T PO  HS 3/12/19  Yes Historical Provider   temazepam (RESTORIL) 30 mg capsule Take by mouth nightly as needed.  9/26/12  Yes Historical Provider   traMADoL (ULTRAM) 50 mg tablet Take 50 mg by mouth every 6 (six) hours as needed. 1/6/22  Yes Historical Provider   vitamin D 1000 units Tab Take 1,000 Units by mouth once daily.   Yes Historical Provider   clindamycin (CLEOCIN) 300 MG capsule clindamycin HCl 300 mg capsule    Historical Provider   coenzyme Q10 10 mg capsule Take 10 mg by mouth once daily.    Historical Provider   docusate sodium (COLACE) 50 MG capsule Take 2 capsules (100 mg total) by mouth 2 (two) times daily.  Patient not taking: Reported on 6/20/2023 7/18/16   Rey Chaidez MD   gabapentin (NEURONTIN) 300 MG capsule gabapentin 300 mg capsule    Historical Provider   levothyroxine (SYNTHROID) 88 MCG tablet Take 1 tablet (88 mcg total) by mouth before breakfast. Daily except Skip Sundays 1/21/19 9/9/20  Jackie Cintron MD   minoxidiL (LONITEN) 2.5 MG tablet Take 2.5 mg by mouth once daily.    Historical Provider        Past medical history, surgical history, and family medical history reviewed and updated as appropriate.    Medications and allergies reviewed.     Objective:          Vitals:    06/20/23 1554   BP: 118/72   BP Location: Right arm   Patient Position: Sitting   Pulse: 91   SpO2: 98%   Weight: 59.2 kg (130 lb 8.2 oz)   Height: 5' 5" (1.651 m)     Body mass index is 21.72 kg/m².  Physical Exam  Skin:     Comments: 7 cm  x 5 cm skin tear with overlapping flap still intact, erythematous bleeding tissue underneath    Small excoriation by elbow       Lab Results   Component Value Date    WBC 5.39 02/01/2022    HGB 11.9 (L) 02/01/2022    HCT 36.5 (L) 02/01/2022     02/01/2022    CHOL 173 02/01/2022    TRIG 95 02/01/2022    " HDL 56 02/01/2022    ALT 17 02/01/2022    AST 18 02/01/2022     02/01/2022    K 4.8 02/01/2022     02/01/2022    CREATININE 0.9 02/01/2022    BUN 21 02/01/2022    CO2 30 (H) 02/01/2022    TSH 5.251 (H) 02/01/2022    INR 0.9 09/09/2020       Assessment:       1. Skin tear of left forearm without complication, subsequent encounter          Plan:     Tamra was seen today for follow-up and laceration.    Diagnoses and all orders for this visit:    Skin tear of left forearm without complication, subsequent encounter    Wound explored, irrigated, patted dry  Skin tear with bleeding when dressing removed, controlled easily  Pt with confusion about wound care  Re-dressed wound, discussed wound care  Pain controlled    Did received Tdap at   Knee without issues    Follow up if symptoms worsen or fail to improve.    Jhon Venegas MD  Internal Medicine / Primary Care  [facility]  6/20/2023

## 2023-07-27 ENCOUNTER — OFFICE VISIT (OUTPATIENT)
Dept: SURGERY | Facility: CLINIC | Age: 73
End: 2023-07-27
Payer: MEDICARE

## 2023-07-27 VITALS
WEIGHT: 133.5 LBS | HEIGHT: 65 IN | OXYGEN SATURATION: 99 % | SYSTOLIC BLOOD PRESSURE: 151 MMHG | BODY MASS INDEX: 22.24 KG/M2 | DIASTOLIC BLOOD PRESSURE: 67 MMHG | HEART RATE: 79 BPM

## 2023-07-27 DIAGNOSIS — D05.12 BREAST NEOPLASM, TIS (DCIS), LEFT: Primary | ICD-10-CM

## 2023-07-27 PROCEDURE — 4010F PR ACE/ARB THEARPY RXD/TAKEN: ICD-10-PCS | Mod: CPTII,S$GLB,, | Performed by: SURGERY

## 2023-07-27 PROCEDURE — 4010F ACE/ARB THERAPY RXD/TAKEN: CPT | Mod: CPTII,S$GLB,, | Performed by: SURGERY

## 2023-07-27 PROCEDURE — 99999 PR PBB SHADOW E&M-EST. PATIENT-LVL III: ICD-10-PCS | Mod: PBBFAC,,, | Performed by: SURGERY

## 2023-07-27 PROCEDURE — 99211 OFF/OP EST MAY X REQ PHY/QHP: CPT | Mod: S$GLB,,, | Performed by: SURGERY

## 2023-07-27 PROCEDURE — 3077F PR MOST RECENT SYSTOLIC BLOOD PRESSURE >= 140 MM HG: ICD-10-PCS | Mod: CPTII,S$GLB,, | Performed by: SURGERY

## 2023-07-27 PROCEDURE — 3008F BODY MASS INDEX DOCD: CPT | Mod: CPTII,S$GLB,, | Performed by: SURGERY

## 2023-07-27 PROCEDURE — 3008F PR BODY MASS INDEX (BMI) DOCUMENTED: ICD-10-PCS | Mod: CPTII,S$GLB,, | Performed by: SURGERY

## 2023-07-27 PROCEDURE — 3288F PR FALLS RISK ASSESSMENT DOCUMENTED: ICD-10-PCS | Mod: CPTII,S$GLB,, | Performed by: SURGERY

## 2023-07-27 PROCEDURE — 99211 PR OFFICE/OUTPT VISIT, EST, LEVL I: ICD-10-PCS | Mod: S$GLB,,, | Performed by: SURGERY

## 2023-07-27 PROCEDURE — 1159F MED LIST DOCD IN RCRD: CPT | Mod: CPTII,S$GLB,, | Performed by: SURGERY

## 2023-07-27 PROCEDURE — 1101F PR PT FALLS ASSESS DOC 0-1 FALLS W/OUT INJ PAST YR: ICD-10-PCS | Mod: CPTII,S$GLB,, | Performed by: SURGERY

## 2023-07-27 PROCEDURE — 3077F SYST BP >= 140 MM HG: CPT | Mod: CPTII,S$GLB,, | Performed by: SURGERY

## 2023-07-27 PROCEDURE — 3078F DIAST BP <80 MM HG: CPT | Mod: CPTII,S$GLB,, | Performed by: SURGERY

## 2023-07-27 PROCEDURE — 3078F PR MOST RECENT DIASTOLIC BLOOD PRESSURE < 80 MM HG: ICD-10-PCS | Mod: CPTII,S$GLB,, | Performed by: SURGERY

## 2023-07-27 PROCEDURE — 99999 PR PBB SHADOW E&M-EST. PATIENT-LVL III: CPT | Mod: PBBFAC,,, | Performed by: SURGERY

## 2023-07-27 PROCEDURE — 3288F FALL RISK ASSESSMENT DOCD: CPT | Mod: CPTII,S$GLB,, | Performed by: SURGERY

## 2023-07-27 PROCEDURE — 1101F PT FALLS ASSESS-DOCD LE1/YR: CPT | Mod: CPTII,S$GLB,, | Performed by: SURGERY

## 2023-07-27 PROCEDURE — 1159F PR MEDICATION LIST DOCUMENTED IN MEDICAL RECORD: ICD-10-PCS | Mod: CPTII,S$GLB,, | Performed by: SURGERY
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
## 2023-09-18 ENCOUNTER — PATIENT OUTREACH (OUTPATIENT)
Dept: ADMINISTRATIVE | Facility: HOSPITAL | Age: 73
End: 2023-09-18
Payer: MEDICARE

## 2023-09-18 ENCOUNTER — PATIENT MESSAGE (OUTPATIENT)
Dept: ADMINISTRATIVE | Facility: HOSPITAL | Age: 73
End: 2023-09-18
Payer: MEDICARE

## 2023-09-18 NOTE — PROGRESS NOTES
Health Maintenance Due   Topic Date Due    Hepatitis C Screening  Never done    Pneumococcal Vaccines (Age 65+) (1 - PCV) Never done    Shingles Vaccine (1 of 2) Never done    COVID-19 Vaccine (5 - Pfizer series) 01/23/2023    Influenza Vaccine (1) Never done     HM updated. Outreached to schedule PCP appt.    Meghan Mann LPN   Clinical Care Coordinator  [facility]ness

## 2023-09-19 ENCOUNTER — HOSPITAL ENCOUNTER (OUTPATIENT)
Dept: RADIOLOGY | Facility: HOSPITAL | Age: 73
Discharge: HOME | End: 2023-09-19
Attending: PHYSICIAN ASSISTANT
Payer: MEDICARE

## 2023-09-19 ENCOUNTER — OFFICE VISIT (OUTPATIENT)
Dept: SURGERY | Facility: CLINIC | Age: 73
End: 2023-09-19
Payer: MEDICARE

## 2023-09-19 VITALS
BODY MASS INDEX: 21.33 KG/M2 | HEIGHT: 65 IN | DIASTOLIC BLOOD PRESSURE: 64 MMHG | SYSTOLIC BLOOD PRESSURE: 115 MMHG | HEART RATE: 69 BPM | WEIGHT: 128 LBS

## 2023-09-19 DIAGNOSIS — Z98.890 S/P LUMPECTOMY, LEFT BREAST: ICD-10-CM

## 2023-09-19 DIAGNOSIS — Z85.3 PERSONAL HISTORY OF BREAST CANCER: Primary | ICD-10-CM

## 2023-09-19 DIAGNOSIS — Z12.31 ENCOUNTER FOR SCREENING MAMMOGRAM FOR MALIGNANT NEOPLASM OF BREAST: ICD-10-CM

## 2023-09-19 DIAGNOSIS — Z12.39 SCREENING BREAST EXAMINATION: ICD-10-CM

## 2023-09-19 PROCEDURE — 3074F SYST BP LT 130 MM HG: CPT | Mod: CPTII,S$GLB,, | Performed by: PHYSICIAN ASSISTANT

## 2023-09-19 PROCEDURE — 77067 SCR MAMMO BI INCL CAD: CPT | Mod: TC

## 2023-09-19 PROCEDURE — 1160F RVW MEDS BY RX/DR IN RCRD: CPT | Mod: CPTII,S$GLB,, | Performed by: PHYSICIAN ASSISTANT

## 2023-09-19 PROCEDURE — 3078F DIAST BP <80 MM HG: CPT | Mod: CPTII,S$GLB,, | Performed by: PHYSICIAN ASSISTANT

## 2023-09-19 PROCEDURE — 99213 OFFICE O/P EST LOW 20 MIN: CPT | Mod: S$GLB,,, | Performed by: PHYSICIAN ASSISTANT

## 2023-09-19 PROCEDURE — 1100F PR PT FALLS ASSESS DOC 2+ FALLS/FALL W/INJURY/YR: ICD-10-PCS | Mod: CPTII,S$GLB,, | Performed by: PHYSICIAN ASSISTANT

## 2023-09-19 PROCEDURE — 3288F FALL RISK ASSESSMENT DOCD: CPT | Mod: CPTII,S$GLB,, | Performed by: PHYSICIAN ASSISTANT

## 2023-09-19 PROCEDURE — 1126F PR PAIN SEVERITY QUANTIFIED, NO PAIN PRESENT: ICD-10-PCS | Mod: CPTII,S$GLB,, | Performed by: PHYSICIAN ASSISTANT

## 2023-09-19 PROCEDURE — 3078F PR MOST RECENT DIASTOLIC BLOOD PRESSURE < 80 MM HG: ICD-10-PCS | Mod: CPTII,S$GLB,, | Performed by: PHYSICIAN ASSISTANT

## 2023-09-19 PROCEDURE — 1159F PR MEDICATION LIST DOCUMENTED IN MEDICAL RECORD: ICD-10-PCS | Mod: CPTII,S$GLB,, | Performed by: PHYSICIAN ASSISTANT

## 2023-09-19 PROCEDURE — 99213 PR OFFICE/OUTPT VISIT, EST, LEVL III, 20-29 MIN: ICD-10-PCS | Mod: S$GLB,,, | Performed by: PHYSICIAN ASSISTANT

## 2023-09-19 PROCEDURE — 77067 MAMMO DIGITAL SCREENING BILAT WITH TOMO: ICD-10-PCS | Mod: 26,,, | Performed by: RADIOLOGY

## 2023-09-19 PROCEDURE — 1160F PR REVIEW ALL MEDS BY PRESCRIBER/CLIN PHARMACIST DOCUMENTED: ICD-10-PCS | Mod: CPTII,S$GLB,, | Performed by: PHYSICIAN ASSISTANT

## 2023-09-19 PROCEDURE — 4010F ACE/ARB THERAPY RXD/TAKEN: CPT | Mod: CPTII,S$GLB,, | Performed by: PHYSICIAN ASSISTANT

## 2023-09-19 PROCEDURE — 1100F PTFALLS ASSESS-DOCD GE2>/YR: CPT | Mod: CPTII,S$GLB,, | Performed by: PHYSICIAN ASSISTANT

## 2023-09-19 PROCEDURE — 99999 PR PBB SHADOW E&M-EST. PATIENT-LVL III: CPT | Mod: PBBFAC,,, | Performed by: PHYSICIAN ASSISTANT

## 2023-09-19 PROCEDURE — 99999 PR PBB SHADOW E&M-EST. PATIENT-LVL III: ICD-10-PCS | Mod: PBBFAC,,, | Performed by: PHYSICIAN ASSISTANT

## 2023-09-19 PROCEDURE — 3074F PR MOST RECENT SYSTOLIC BLOOD PRESSURE < 130 MM HG: ICD-10-PCS | Mod: CPTII,S$GLB,, | Performed by: PHYSICIAN ASSISTANT

## 2023-09-19 PROCEDURE — 77063 MAMMO DIGITAL SCREENING BILAT WITH TOMO: ICD-10-PCS | Mod: 26,,, | Performed by: RADIOLOGY

## 2023-09-19 PROCEDURE — 1159F MED LIST DOCD IN RCRD: CPT | Mod: CPTII,S$GLB,, | Performed by: PHYSICIAN ASSISTANT

## 2023-09-19 PROCEDURE — 77063 BREAST TOMOSYNTHESIS BI: CPT | Mod: 26,,, | Performed by: RADIOLOGY

## 2023-09-19 PROCEDURE — 3288F PR FALLS RISK ASSESSMENT DOCUMENTED: ICD-10-PCS | Mod: CPTII,S$GLB,, | Performed by: PHYSICIAN ASSISTANT

## 2023-09-19 PROCEDURE — 77067 SCR MAMMO BI INCL CAD: CPT | Mod: 26,,, | Performed by: RADIOLOGY

## 2023-09-19 PROCEDURE — 1126F AMNT PAIN NOTED NONE PRSNT: CPT | Mod: CPTII,S$GLB,, | Performed by: PHYSICIAN ASSISTANT

## 2023-09-19 PROCEDURE — 4010F PR ACE/ARB THEARPY RXD/TAKEN: ICD-10-PCS | Mod: CPTII,S$GLB,, | Performed by: PHYSICIAN ASSISTANT

## 2023-09-19 NOTE — PROGRESS NOTES
[facility]  Department of Surgery    REFERRING PROVIDER: No referring provider defined for this encounter. Heraclio Mulligan MD  CHIEF COMPLAINT:   Chief Complaint   Patient presents with    Follow-up       DIAGNOSIS:   This is a 73 y.o. female with a history of DCIS of the LEFT breast here today for follow up breast cancer screening.      HISTORY OF PRESENT ILLNESS:      Patient has a history of core biopsy left breast 2016 with DCIS, left lumpectomy 2016, adjuvant XRT. She did not take endocrine therapy. She was seen by Dr. Robins on 2019 due to left breast cyst and left breast pain. She had imaging with mammogram and ultrasound on 19 which showed left breast cyst measuring 35 mm x 21 mm x 9 mm cyst at the 8 o'clock position, this was read as BI-RADS 3 and short interval follow up was recommended. She had a follow up ultrasound on 3/2/2020 which showed decreased size of a left breast seroma at 8 o'clock, 8 cm from the nipple measuring 23 x 6 x 11 mm, previously 35 x 9 x 21 mm.      INTERVAL HISTORY:   Tamra Astorga is a 73 y.o. female comes in for oncological follow up. Admits to left breast pain. She was seen by Dr. Robins in July who reassured no concerning findings. Patient states she has been doing well since she was last seen. She denies change in her breast self-exam specifically denying new masses, skin or nipple changes, or nipple discharge. Past medical and surgical history is updated with no new changes. There have been no changes to family history. The patient denies constitutional symptoms of night sweats, chills, weight loss, new headaches, visual changes, new back or bony pain, chest pain, or shortness of breath.      Review of Systems: See HPI/Interval History for other systems reviewed.     IMAGIN/19/23 Bilateral Diagnostic Mammo:     Findings:   This procedure was performed using tomosynthesis.   Computer-aided detection was utilized in the interpretation of  this examination.     The left breast has scattered areas of fibroglandular density. There is no evidence of suspicious masses, microcalcifications or architectural distortion.     Asymmetry in the left breast central region seen on MLO view does not persist on additional views.      Impression:   No mammographic evidence of malignancy.     BI-RADS Category 1: Negative     Recommendation:  Routine screening mammogram in 1 year is recommended.    MEDICATIONS/ALLERGIES:     Current Outpatient Medications   Medication Sig    acetaminophen (TYLENOL) 500 MG tablet acetaminophen 500 mg tablet   Take 2 tablets every 8 hours by oral route for 30 days.    ascorbic acid, vitamin C, (VITAMIN C) 100 MG tablet Take 100 mg by mouth once daily.    celecoxib (CELEBREX) 200 MG capsule Celebrex 200 mg capsule   Take 1 capsule twice a day by oral route.    clindamycin (CLEOCIN) 300 MG capsule clindamycin HCl 300 mg capsule    coenzyme Q10 10 mg capsule Take 10 mg by mouth once daily.    gabapentin (NEURONTIN) 300 MG capsule gabapentin 300 mg capsule    hydroCHLOROthiazide (HYDRODIURIL) 12.5 MG Tab TK 1 T PO D IN THE MORNING    ibuprofen (ADVIL,MOTRIN) 800 MG tablet ibuprofen 800 mg tablet   TAKE 1 TABLET BY MOUTH EVERY 8 HOURS AS NEEDED FOR PAIN    minoxidiL (LONITEN) 2.5 MG tablet Take 2.5 mg by mouth once daily.    multivitamin (THERAGRAN) per tablet Take 1 tablet by mouth.    mupirocin (BACTROBAN) 2 % ointment Apply topically 3 (three) times daily.    MV,ROMAIN,IRON,MN/FOLIC ACID/CHOL (BODY, HAIR, SKIN AND NAILS ORAL) Take 1 tablet by mouth.    olmesartan (BENICAR) 20 MG tablet olmesartan 20 mg tablet    omeprazole (PRILOSEC) 20 MG capsule omeprazole 20 mg capsule,delayed release    pravastatin (PRAVACHOL) 40 MG tablet TK 1 T PO  HS    temazepam (RESTORIL) 30 mg capsule Take by mouth nightly as needed.     traMADoL (ULTRAM) 50 mg tablet Take 50 mg by mouth every 6 (six) hours as needed.    vitamin D 1000 units Tab Take 1,000 Units by  "mouth once daily.    docusate sodium (COLACE) 50 MG capsule Take 2 capsules (100 mg total) by mouth 2 (two) times daily. (Patient not taking: Reported on 6/20/2023)    levothyroxine (SYNTHROID) 88 MCG tablet Take 1 tablet (88 mcg total) by mouth before breakfast. Daily except Skip Sundays     No current facility-administered medications for this visit.      Review of patient's allergies indicates:   Allergen Reactions    Sulfa (sulfonamide antibiotics)      Difficulty breathing as a child       PHYSICAL EXAM:   /64 (BP Location: Left arm, Patient Position: Sitting, BP Method: Medium (Automatic))   Pulse 69   Ht 5' 5" (1.651 m)   Wt 58.1 kg (128 lb)   BMI 21.30 kg/m²     Physical Exam   Constitutional: She is oriented to person, place, and time. She appears well-developed.   HENT:   Head: Normocephalic.   Eyes: Pupils are equal, round, and reactive to light. Right eye exhibits no discharge. Left eye exhibits no discharge.   Cardiovascular:  Normal rate.            Pulmonary/Chest: Effort normal. No respiratory distress. She has no wheezes. She exhibits no tenderness. Right breast exhibits no inverted nipple, no mass, no nipple discharge, no skin change and no tenderness. Left breast exhibits no inverted nipple, no mass, no nipple discharge, no skin change and no tenderness. Breasts are symmetrical.       Musculoskeletal: Normal range of motion. Lymphadenopathy:      Cervical: No cervical adenopathy.     Neurological: She is alert and oriented to person, place, and time.   Skin: Skin is warm and dry. No erythema.     Psychiatric: Her behavior is normal.     Exam done in the upright and supine position.     ASSESSMENT:   This is a 73 y.o. female without evidence of recurrence by exam, history or imaging.       PLAN:   We discussed there was nothing concerning on exam or imaging today.     1. Continue monthly self breast exams and call the clinic with any new breast changes or problems.  2. Bilateral Screening " Mammogram in 1 year .  3. Return to clinic in 1 year  for CBE    The patient is in agreement with the plan. Questions were encouraged and answered to patient's satisfaction. Tamra will call our office with any questions or concerns.

## 2024-06-11 ENCOUNTER — TELEPHONE (OUTPATIENT)
Dept: SURGERY | Facility: CLINIC | Age: 74
End: 2024-06-11
Payer: MEDICARE

## 2024-06-11 ENCOUNTER — PATIENT MESSAGE (OUTPATIENT)
Dept: SURGERY | Facility: CLINIC | Age: 74
End: 2024-06-11
Payer: MEDICARE

## 2024-06-11 DIAGNOSIS — Z98.890 S/P LUMPECTOMY, LEFT BREAST: Primary | ICD-10-CM

## 2024-06-11 DIAGNOSIS — Z12.31 SCREENING MAMMOGRAM, ENCOUNTER FOR: ICD-10-CM

## 2024-06-11 DIAGNOSIS — Z12.39 SCREENING BREAST EXAMINATION: ICD-10-CM

## 2024-09-18 ENCOUNTER — HOSPITAL ENCOUNTER (OUTPATIENT)
Dept: RADIOLOGY | Facility: HOSPITAL | Age: 74
Discharge: HOME | End: 2024-09-18
Attending: FAMILY MEDICINE
Payer: MEDICARE

## 2024-09-18 DIAGNOSIS — M81.0 SENILE OSTEOPOROSIS: ICD-10-CM

## 2024-09-18 PROCEDURE — 77080 DXA BONE DENSITY AXIAL: CPT | Mod: TC

## 2024-09-23 ENCOUNTER — OFFICE VISIT (OUTPATIENT)
Dept: SURGERY | Facility: CLINIC | Age: 74
End: 2024-09-23
Payer: MEDICARE

## 2024-09-23 ENCOUNTER — HOSPITAL ENCOUNTER (OUTPATIENT)
Dept: RADIOLOGY | Facility: HOSPITAL | Age: 74
Discharge: HOME | End: 2024-09-23
Attending: PHYSICIAN ASSISTANT
Payer: MEDICARE

## 2024-09-23 VITALS
HEART RATE: 76 BPM | BODY MASS INDEX: 21.33 KG/M2 | SYSTOLIC BLOOD PRESSURE: 109 MMHG | WEIGHT: 128 LBS | DIASTOLIC BLOOD PRESSURE: 64 MMHG | HEIGHT: 65 IN

## 2024-09-23 DIAGNOSIS — Z12.39 SCREENING BREAST EXAMINATION: ICD-10-CM

## 2024-09-23 DIAGNOSIS — Z98.890 S/P LUMPECTOMY, LEFT BREAST: ICD-10-CM

## 2024-09-23 DIAGNOSIS — Z12.31 SCREENING MAMMOGRAM, ENCOUNTER FOR: ICD-10-CM

## 2024-09-23 DIAGNOSIS — Z85.3 PERSONAL HISTORY OF BREAST CANCER: Primary | ICD-10-CM

## 2024-09-23 PROCEDURE — 77067 SCR MAMMO BI INCL CAD: CPT | Mod: 26,,, | Performed by: RADIOLOGY

## 2024-09-23 PROCEDURE — 77063 BREAST TOMOSYNTHESIS BI: CPT | Mod: TC

## 2024-09-23 PROCEDURE — 77067 SCR MAMMO BI INCL CAD: CPT | Mod: TC

## 2024-09-23 PROCEDURE — 99999 PR PBB SHADOW E&M-EST. PATIENT-LVL III: CPT | Mod: PBBFAC,,, | Performed by: PHYSICIAN ASSISTANT

## 2024-09-23 PROCEDURE — 77063 BREAST TOMOSYNTHESIS BI: CPT | Mod: 26,,, | Performed by: RADIOLOGY

## 2024-09-23 NOTE — PROGRESS NOTES
[facility]  Department of Surgery    REFERRING PROVIDER:  No, Primary Doctor  CHIEF COMPLAINT:   Chief Complaint   Patient presents with    CBE and Mammogram       DIAGNOSIS:   This is a 74 y.o. female with a history of DCIS of the LEFT breast here today for follow up breast cancer screening.      HISTORY OF PRESENT ILLNESS:      Patient has a history of core biopsy left breast 2016 with DCIS, left lumpectomy 2016, adjuvant XRT. She did not take endocrine therapy. She was seen by Dr. Robins on 2019 due to left breast cyst and left breast pain. She had imaging with mammogram and ultrasound on 19 which showed left breast cyst measuring 35 mm x 21 mm x 9 mm cyst at the 8 o'clock position, this was read as BI-RADS 3 and short interval follow up was recommended. She had a follow up ultrasound on 3/2/2020 which showed decreased size of a left breast seroma at 8 o'clock, 8 cm from the nipple measuring 23 x 6 x 11 mm, previously 35 x 9 x 21 mm.      INTERVAL HISTORY:   Tamra Astorga is a 74 y.o. female comes in for oncological follow up. Patient states she has been doing well since she was last seen. She denies change in her breast self-exam specifically denying new masses, skin or nipple changes, or nipple discharge. Past medical and surgical history is updated with no new changes. There have been no changes to family history. The patient denies constitutional symptoms of night sweats, chills, weight loss, new headaches, visual changes, new back or bony pain, chest pain, or shortness of breath.      Review of Systems: See HPI/Interval History for other systems reviewed.     IMAGIN/9/24 Bilateral Screening Mammo:    Findings:  This procedure was performed using tomosynthesis. Computer-aided detection was utilized in the interpretation of this examination.     There are scattered areas of fibroglandular density.      Left  There are post-surgical findings from a previous lumpectomy with  radiation seen in the lower inner quadrant of the left breast in the posterior depth. Compared to the previous study, there are no significant changes. There has been no interval development of a suspicious mass, microcalcification, or architectural distortion.      There is no evidence of suspicious masses, calcifications, or other abnormal findings in the left breast.     Right  There is no evidence of suspicious masses, calcifications, or other abnormal findings in the right breast.     Impression:  Bilateral  There is no mammographic evidence of malignancy.     BI-RADS Category:   Overall: 2 - Benign        Recommendation:  Routine screening mammogram in 1 year is recommended.    MEDICATIONS/ALLERGIES:     Current Outpatient Medications   Medication Sig    acetaminophen (TYLENOL) 500 MG tablet acetaminophen 500 mg tablet   Take 2 tablets every 8 hours by oral route for 30 days.    ascorbic acid, vitamin C, (VITAMIN C) 100 MG tablet Take 100 mg by mouth once daily.    celecoxib (CELEBREX) 200 MG capsule Celebrex 200 mg capsule   Take 1 capsule twice a day by oral route.    clindamycin (CLEOCIN) 300 MG capsule clindamycin HCl 300 mg capsule    coenzyme Q10 10 mg capsule Take 10 mg by mouth once daily.    docusate sodium (COLACE) 50 MG capsule Take 2 capsules (100 mg total) by mouth 2 (two) times daily.    gabapentin (NEURONTIN) 300 MG capsule gabapentin 300 mg capsule    hydroCHLOROthiazide (HYDRODIURIL) 12.5 MG Tab TK 1 T PO D IN THE MORNING    ibuprofen (ADVIL,MOTRIN) 800 MG tablet ibuprofen 800 mg tablet   TAKE 1 TABLET BY MOUTH EVERY 8 HOURS AS NEEDED FOR PAIN    minoxidiL (LONITEN) 2.5 MG tablet Take 2.5 mg by mouth once daily.    multivitamin (THERAGRAN) per tablet Take 1 tablet by mouth.    mupirocin (BACTROBAN) 2 % ointment Apply topically 3 (three) times daily.    MV,ROMAIN,IRON,MN/FOLIC ACID/CHOL (BODY, HAIR, SKIN AND NAILS ORAL) Take 1 tablet by mouth.    olmesartan (BENICAR) 20 MG tablet olmesartan 20 mg  "tablet    omeprazole (PRILOSEC) 20 MG capsule omeprazole 20 mg capsule,delayed release    pravastatin (PRAVACHOL) 40 MG tablet TK 1 T PO  HS    temazepam (RESTORIL) 30 mg capsule Take by mouth nightly as needed.     traMADoL (ULTRAM) 50 mg tablet Take 50 mg by mouth every 6 (six) hours as needed.    vitamin D 1000 units Tab Take 1,000 Units by mouth once daily.    levothyroxine (SYNTHROID) 88 MCG tablet Take 1 tablet (88 mcg total) by mouth before breakfast. Daily except Skip Sundays     No current facility-administered medications for this visit.      Review of patient's allergies indicates:   Allergen Reactions    Sulfa (sulfonamide antibiotics)      Difficulty breathing as a child       PHYSICAL EXAM:   /64   Pulse 76   Ht 5' 5" (1.651 m)   Wt 58.1 kg (128 lb)   BMI 21.30 kg/m²     Physical Exam   Constitutional: She is oriented to person, place, and time. She appears well-developed.   HENT:   Head: Normocephalic.   Eyes: Pupils are equal, round, and reactive to light. Right eye exhibits no discharge. Left eye exhibits no discharge.   Cardiovascular:  Normal rate.            Pulmonary/Chest: Effort normal. No respiratory distress. She has no wheezes. She exhibits no tenderness. Right breast exhibits no inverted nipple, no mass, no nipple discharge, no skin change and no tenderness. Left breast exhibits no inverted nipple, no mass, no nipple discharge, no skin change and no tenderness. Breasts are symmetrical.       Musculoskeletal: Normal range of motion. Lymphadenopathy:      Cervical: No cervical adenopathy.     Neurological: She is alert and oriented to person, place, and time.   Skin: Skin is warm and dry. No erythema.     Psychiatric: Her behavior is normal.     Exam done in the upright and supine position.     ASSESSMENT:   This is a 74 y.o. female without evidence of recurrence by exam, history or imaging.       PLAN:   We discussed there was nothing concerning on exam or imaging today.     1. " Continue monthly self breast exams and call the clinic with any new breast changes or problems.  2. Bilateral Screening Mammogram in 1 year .  3. Return to clinic in 1 year  for CBE    The patient is in agreement with the plan. Questions were encouraged and answered to patient's satisfaction. Tamra will call our office with any questions or concerns.